# Patient Record
Sex: FEMALE | Race: WHITE | NOT HISPANIC OR LATINO | Employment: OTHER | URBAN - METROPOLITAN AREA
[De-identification: names, ages, dates, MRNs, and addresses within clinical notes are randomized per-mention and may not be internally consistent; named-entity substitution may affect disease eponyms.]

---

## 2017-02-13 RX ORDER — CARVEDILOL 6.25 MG/1
TABLET ORAL
Qty: 90 TABLET | Refills: 0 | Status: SHIPPED | OUTPATIENT
Start: 2017-02-13 | End: 2017-05-17 | Stop reason: SDUPTHER

## 2017-02-13 RX ORDER — FUROSEMIDE 40 MG/1
TABLET ORAL
Qty: 90 TABLET | Refills: 0 | Status: SHIPPED | OUTPATIENT
Start: 2017-02-13 | End: 2017-05-17 | Stop reason: SDUPTHER

## 2017-02-22 RX ORDER — ISOSORBIDE MONONITRATE 30 MG/1
TABLET, EXTENDED RELEASE ORAL
Qty: 90 TABLET | Refills: 1 | Status: SHIPPED | OUTPATIENT
Start: 2017-02-22 | End: 2017-09-25 | Stop reason: SDUPTHER

## 2017-03-02 RX ORDER — ATORVASTATIN CALCIUM 20 MG/1
TABLET, FILM COATED ORAL
Qty: 30 TABLET | Refills: 0 | Status: SHIPPED | OUTPATIENT
Start: 2017-03-02 | End: 2017-03-20

## 2017-03-06 ENCOUNTER — TELEPHONE (OUTPATIENT)
Dept: FAMILY MEDICINE CLINIC | Facility: CLINIC | Age: 67
End: 2017-03-06

## 2017-03-06 NOTE — TELEPHONE ENCOUNTER
ORDER FAXED    ----- Message from Imtiaz Cuellar sent at 3/3/2017 10:43 AM EST -----  PLEASE SEND ORDER FOR LABS TO [facility] ON [address] FOR HER TO HAVE DONE BEFORE HER FU APPT. WITH  ON 3/20/17.  SHE IS WANTING TO GO GET LABS DONE WK OF 3/6/17.  SHE WOULD LIKE TO GO 3/6/17 OR 3/7/17.

## 2017-03-07 RX ORDER — ATORVASTATIN CALCIUM 20 MG/1
TABLET, FILM COATED ORAL
Qty: 30 TABLET | Refills: 0 | Status: SHIPPED | OUTPATIENT
Start: 2017-03-07 | End: 2017-07-07 | Stop reason: CLARIF

## 2017-03-08 ENCOUNTER — TRANSCRIBE ORDERS (OUTPATIENT)
Dept: LAB | Facility: HOSPITAL | Age: 67
End: 2017-03-08

## 2017-03-08 ENCOUNTER — LAB (OUTPATIENT)
Dept: LAB | Facility: HOSPITAL | Age: 67
End: 2017-03-08

## 2017-03-08 DIAGNOSIS — I15.9 SECONDARY HYPERTENSION: ICD-10-CM

## 2017-03-08 DIAGNOSIS — E11.69 TYPE 2 DIABETES MELLITUS WITH OTHER SPECIFIED COMPLICATION (HCC): ICD-10-CM

## 2017-03-08 DIAGNOSIS — N18.9 CHRONIC KIDNEY DISEASE, UNSPECIFIED STAGE: ICD-10-CM

## 2017-03-08 DIAGNOSIS — E78.5 HYPERLIPIDEMIA, UNSPECIFIED HYPERLIPIDEMIA TYPE: ICD-10-CM

## 2017-03-08 DIAGNOSIS — E11.69 TYPE 2 DIABETES MELLITUS WITH OTHER SPECIFIED COMPLICATION (HCC): Primary | ICD-10-CM

## 2017-03-08 LAB
ALBUMIN SERPL-MCNC: 4.1 G/DL (ref 3.5–5.2)
ALBUMIN/GLOB SERPL: 1.5 G/DL
ALP SERPL-CCNC: 68 U/L (ref 39–117)
ALT SERPL W P-5'-P-CCNC: 30 U/L (ref 1–33)
ANION GAP SERPL CALCULATED.3IONS-SCNC: 14 MMOL/L
AST SERPL-CCNC: 28 U/L (ref 1–32)
BASOPHILS # BLD AUTO: 0.01 10*3/MM3 (ref 0–0.2)
BASOPHILS NFR BLD AUTO: 0.3 % (ref 0–1.5)
BILIRUB SERPL-MCNC: 0.3 MG/DL (ref 0.1–1.2)
BUN BLD-MCNC: 21 MG/DL (ref 8–23)
BUN/CREAT SERPL: 15.8 (ref 7–25)
CALCIUM SPEC-SCNC: 10.4 MG/DL (ref 8.6–10.5)
CHLORIDE SERPL-SCNC: 103 MMOL/L (ref 98–107)
CHOLEST SERPL-MCNC: 218 MG/DL (ref 0–200)
CO2 SERPL-SCNC: 24 MMOL/L (ref 22–29)
CREAT BLD-MCNC: 1.33 MG/DL (ref 0.57–1)
DEPRECATED RDW RBC AUTO: 43.9 FL (ref 37–54)
EOSINOPHIL # BLD AUTO: 0.06 10*3/MM3 (ref 0–0.7)
EOSINOPHIL NFR BLD AUTO: 1.5 % (ref 0.3–6.2)
ERYTHROCYTE [DISTWIDTH] IN BLOOD BY AUTOMATED COUNT: 13.1 % (ref 11.7–13)
GFR SERPL CREATININE-BSD FRML MDRD: 40 ML/MIN/1.73
GLOBULIN UR ELPH-MCNC: 2.7 GM/DL
GLUCOSE BLD-MCNC: 153 MG/DL (ref 65–99)
HBA1C MFR BLD: 6.9 % (ref 4.8–5.6)
HCT VFR BLD AUTO: 33.2 % (ref 35.6–45.5)
HDLC SERPL-MCNC: 53 MG/DL (ref 40–60)
HGB BLD-MCNC: 11.5 G/DL (ref 11.9–15.5)
IMM GRANULOCYTES # BLD: 0 10*3/MM3 (ref 0–0.03)
IMM GRANULOCYTES NFR BLD: 0 % (ref 0–0.5)
LDLC SERPL CALC-MCNC: 120 MG/DL (ref 0–100)
LDLC/HDLC SERPL: 2.26 {RATIO}
LYMPHOCYTES # BLD AUTO: 1.14 10*3/MM3 (ref 0.9–4.8)
LYMPHOCYTES NFR BLD AUTO: 29.2 % (ref 19.6–45.3)
MCH RBC QN AUTO: 31.9 PG (ref 26.9–32)
MCHC RBC AUTO-ENTMCNC: 34.6 G/DL (ref 32.4–36.3)
MCV RBC AUTO: 92 FL (ref 80.5–98.2)
MONOCYTES # BLD AUTO: 0.19 10*3/MM3 (ref 0.2–1.2)
MONOCYTES NFR BLD AUTO: 4.9 % (ref 5–12)
NEUTROPHILS # BLD AUTO: 2.51 10*3/MM3 (ref 1.9–8.1)
NEUTROPHILS NFR BLD AUTO: 64.1 % (ref 42.7–76)
PLATELET # BLD AUTO: 101 10*3/MM3 (ref 140–500)
PMV BLD AUTO: 10.3 FL (ref 6–12)
POTASSIUM BLD-SCNC: 3.9 MMOL/L (ref 3.5–5.2)
PROT SERPL-MCNC: 6.8 G/DL (ref 6–8.5)
RBC # BLD AUTO: 3.61 10*6/MM3 (ref 3.9–5.2)
SODIUM BLD-SCNC: 141 MMOL/L (ref 136–145)
TRIGL SERPL-MCNC: 227 MG/DL (ref 0–150)
VLDLC SERPL-MCNC: 45.4 MG/DL (ref 5–40)
WBC NRBC COR # BLD: 3.91 10*3/MM3 (ref 4.5–10.7)

## 2017-03-08 PROCEDURE — 85025 COMPLETE CBC W/AUTO DIFF WBC: CPT

## 2017-03-08 PROCEDURE — 36415 COLL VENOUS BLD VENIPUNCTURE: CPT

## 2017-03-08 PROCEDURE — 83036 HEMOGLOBIN GLYCOSYLATED A1C: CPT

## 2017-03-08 PROCEDURE — 80061 LIPID PANEL: CPT

## 2017-03-08 PROCEDURE — 80053 COMPREHEN METABOLIC PANEL: CPT

## 2017-03-20 ENCOUNTER — OFFICE VISIT (OUTPATIENT)
Dept: FAMILY MEDICINE CLINIC | Facility: CLINIC | Age: 67
End: 2017-03-20

## 2017-03-20 VITALS
SYSTOLIC BLOOD PRESSURE: 148 MMHG | RESPIRATION RATE: 16 BRPM | HEART RATE: 80 BPM | DIASTOLIC BLOOD PRESSURE: 68 MMHG | HEIGHT: 63 IN | OXYGEN SATURATION: 97 % | TEMPERATURE: 98.1 F | WEIGHT: 194 LBS | BODY MASS INDEX: 34.38 KG/M2

## 2017-03-20 DIAGNOSIS — I25.10 CHRONIC CORONARY ARTERY DISEASE: ICD-10-CM

## 2017-03-20 DIAGNOSIS — R53.82 CHRONIC FATIGUE: ICD-10-CM

## 2017-03-20 DIAGNOSIS — D64.9 ANEMIA, UNSPECIFIED TYPE: ICD-10-CM

## 2017-03-20 DIAGNOSIS — D69.6 THROMBOCYTOPENIA (HCC): ICD-10-CM

## 2017-03-20 DIAGNOSIS — N18.2 CHRONIC KIDNEY DISEASE, STAGE II (MILD): ICD-10-CM

## 2017-03-20 DIAGNOSIS — E11.21 TYPE 2 DIABETES MELLITUS WITH DIABETIC NEPHROPATHY, WITHOUT LONG-TERM CURRENT USE OF INSULIN (HCC): ICD-10-CM

## 2017-03-20 DIAGNOSIS — I10 ESSENTIAL HYPERTENSION: ICD-10-CM

## 2017-03-20 DIAGNOSIS — E78.2 MIXED HYPERLIPIDEMIA: ICD-10-CM

## 2017-03-20 DIAGNOSIS — R05.9 COUGH: Primary | ICD-10-CM

## 2017-03-20 PROCEDURE — 99213 OFFICE O/P EST LOW 20 MIN: CPT

## 2017-03-20 RX ORDER — PROMETHAZINE HYDROCHLORIDE AND CODEINE PHOSPHATE 6.25; 1 MG/5ML; MG/5ML
5 SYRUP ORAL EVERY 4 HOURS PRN
Qty: 180 ML | Refills: 0 | Status: SHIPPED | OUTPATIENT
Start: 2017-03-20 | End: 2017-05-12

## 2017-03-20 NOTE — PROGRESS NOTES
Subjective   Paty Hernandez is a 66 y.o. female. Patient is here today for   Chief Complaint   Patient presents with   • Diabetes   • Hyperlipidemia   • Hypertension   • Cough          Vitals:    03/20/17 1048   BP: 148/68   Pulse: 80   Resp: 16   Temp: 98.1 °F (36.7 °C)   SpO2: 97%     The following portions of the patient's history were reviewed and updated as appropriate: allergies, current medications, past family history, past medical history, past social history, past surgical history and problem list.    Past Medical History   Diagnosis Date   • Arthritis    • Asthmatic bronchitis    • Breast mass, right    • Bronchitis    • Cough    • Diabetes mellitus      TYPE 2   • Dizziness    • GERD (gastroesophageal reflux disease)    • Heart burn    • Hypertension    • Irritable bowel syndrome    • Migraine headache    • UTI (urinary tract infection)       Allergies   Allergen Reactions   • Contrast Dye      Due to renal insufficiency not d/t a reaction   • Iodinated Diagnostic Agents      Due to renal insufficiency not d/t a reaction   • Niacin And Related    • Sulfa Antibiotics    • Chloraprep One Step [Chlorhexidine Gluconate] Rash     Blistery Rash      Social History     Social History   • Marital status:      Spouse name: N/A   • Number of children: N/A   • Years of education: N/A     Occupational History   • Not on file.     Social History Main Topics   • Smoking status: Former Smoker   • Smokeless tobacco: Never Used   • Alcohol use Yes      Comment: SOCIAL   • Drug use: No   • Sexual activity: Not on file     Other Topics Concern   • Not on file     Social History Narrative        Current Outpatient Prescriptions:   •  ALPRAZolam (XANAX) 1 MG tablet, Take 1 tablet by mouth 3 (three) times a day as needed for anxiety., Disp: 90 tablet, Rfl: 1  •  Ascorbic Acid (VITAMIN C PO), Take 1 tablet/day by mouth daily., Disp: , Rfl:   •  aspirin 81 MG tablet, Take 81 mg by mouth daily., Disp: , Rfl:   •   atorvastatin (LIPITOR) 20 MG tablet, TAKE ONE TABLET BY MOUTH DAILY, Disp: 30 tablet, Rfl: 0  •  carvedilol (COREG) 6.25 MG tablet, TAKE ONE TABLET BY MOUTH DAILY, Disp: 90 tablet, Rfl: 0  •  Cetirizine HCl (ZYRTEC ALLERGY) 10 MG capsule, Take 10 mg by mouth daily., Disp: , Rfl:   •  cholecalciferol (VITAMIN D3) 1000 UNITS tablet, Take 1,000 Units by mouth daily., Disp: , Rfl:   •  esomeprazole (NexIUM) 40 MG capsule, , Disp: , Rfl:   •  FIBER PO, Take  by mouth daily., Disp: , Rfl:   •  furosemide (LASIX) 40 MG tablet, TAKE ONE TABLET BY MOUTH DAILY, Disp: 90 tablet, Rfl: 0  •  isosorbide mononitrate (IMDUR) 30 MG 24 hr tablet, TAKE ONE TABLET BY MOUTH DAILY, Disp: 90 tablet, Rfl: 1  •  losartan (COZAAR) 100 MG tablet, TAKE ONE TABLET BY MOUTH DAILY, Disp: 90 tablet, Rfl: 2  •  lubiprostone (AMITIZA) 8 MCG capsule, Take 8 mcg by mouth daily as needed for constipation. Take 2 tablets as needed, Disp: , Rfl:   •  MELATONIN PO, Take 1 tablet/day by mouth daily as needed., Disp: , Rfl:   •  montelukast (SINGULAIR) 10 MG tablet, TAKE ONE TABLET BY MOUTH DAILY, Disp: 90 tablet, Rfl: 1  •  Multiple Vitamins-Minerals (MULTIVITAMIN ADULT PO), Take 1 tablet by mouth daily., Disp: , Rfl:   •  omeprazole (priLOSEC) 20 MG capsule, TAKE ONE CAPSULE BY MOUTH EVERY MORNING, Disp: 90 capsule, Rfl: 3  •  polyethylene glycol (MIRALAX) packet, Take 17 g by mouth daily as needed., Disp: , Rfl:   •  Probiotic Product (PROBIOTIC PO), Take 2 tablet/day by mouth daily., Disp: , Rfl:   •  promethazine-codeine (PHENERGAN with CODEINE) 6.25-10 MG/5ML syrup, Take 5 mL by mouth Every 4 (Four) Hours As Needed for Cough. TAKE 1 TO 2 TEASPOONFULS BY MOUTH EVERY 4 HOURS AS NEEDED FOR COUGH, Disp: 180 mL, Rfl: 0  •  sertraline (ZOLOFT) 100 MG tablet, TAKE TWO TABLETS BY MOUTH EVERY NIGHT AT BEDTIME, Disp: 180 tablet, Rfl: 1  •  SITagliptin (JANUVIA) 50 MG tablet, Take 1 tablet by mouth Daily., Disp: 30 tablet, Rfl: 5  •  tiZANidine (ZANAFLEX) 4 MG  tablet, Take 4 mg by mouth daily as needed., Disp: , Rfl:   •  vitamin A 15103 UNIT capsule, Take 10,000 Units by mouth daily., Disp: , Rfl:   •  vitamin B-6 (PYRIDOXINE) 100 MG tablet, Take  by mouth daily., Disp: , Rfl:      Objective     History of Present Illness   The patient is here today for follow-up on type 2 diabetes mellitus, essential hypertension, hyperlipidemia, chronic coronary artery disease and persistent cough for well over a month    Review of Systems   Constitutional: Negative for chills and fever.   HENT:        The patient feels a sense of congestion in her sinuses, head, and ears.  She senses some postnasal drainage which tends to make her cough quite frequently.  The patient has no fever or chills at this time.  She takes over-the-counter Zyrtec and prescription Singulair daily.   Respiratory: Negative for wheezing.         The patient states she has had a cough, quite frequent, the last few months.  The patient states that the cough is usually minimally productive.  She has mild shortness of air with exertion.   Cardiovascular: Negative for chest pain, palpitations and leg swelling.   Gastrointestinal: Negative for abdominal pain and blood in stool.        The patient does have chronic constipation and occasionally uses Amitiza for this   Genitourinary:        The patient was having severe problems with persistent urinary tract infection last year.  Currently she is having no symptoms.   Musculoskeletal:        The patient has a long history of chronic back pain from degenerative arthritis and degenerative disc disease of the spine   Neurological: Negative.    Hematological: Negative for adenopathy. Does not bruise/bleed easily.   Psychiatric/Behavioral:        The patient has a history of depression but is doing fairly well on her current medications.  The patient had to put her  in a nursing home due to dementia which has caused her much stress.       Physical Exam   Constitutional:  She is oriented to person, place, and time. She appears well-developed and well-nourished.   Overweight   HENT:   Mouth/Throat: Oropharynx is clear and moist.   Tympanic membranes are mildly inflamed bilaterally.  Sinuses seem somewhat congested.  Nose is somewhat congested.   Neck:   Carotid pulses normal.   Cardiovascular: Normal rate and regular rhythm.    Pulmonary/Chest: Effort normal and breath sounds normal. No respiratory distress. She has no wheezes. She has no rales.   Abdominal: Soft. Bowel sounds are normal.   Musculoskeletal:   Mild osteoarthritic changes in multiple joints.  Restriction of motion in the lumbar spine area.   Neurological: She is alert and oriented to person, place, and time.   Psychiatric: She has a normal mood and affect.   Nursing note and vitals reviewed.      ASSESSMENT  #1 type 2 diabetes mellitus        #2 essential hypertension        #3 mixed hyperlipidemia          #4 chronic coronary artery disease         #5 persistent cough           #6 chronic kidney disease, moderate         #7 mild anemia    DISCUSSION/SUMMARY   The patient's vital signs are normal today.  Because of her cough.  I did a chest x-ray which is essentially normal except for an old calcified granuloma in the left lower lobe and is unchanged from a chest x-ray that was done here in 2012.  CBC showed a slightly low hemoglobin of 11.5 and a very slightly decreased total white blood cell count and low platelet count of 101,000.  CMP shows an elevated fasting blood sugar 153 and an elevated serum creatinine at 1.33.  Total cholesterol is elevated at 218, triglycerides 227, HDL cholesterol 53, and LDL cholesterol is 120.  The patient admits that she has not eaten nearly as well as she should because she is now living alone and also she is getting very little exercise.  I encouraged her to increase her walking and admonished her to decrease her sugar and starch in her diet.  With respect to her cough I believe she  has upper respiratory problems giving her postnasal drainage which is causing her chronic cough.  I have asked her to see her otolaryngologist, Dr. Hare, because she is already seen her allergist and allergy testing was negative.    PLAN  Continue same medications except I have asked her to take some over-the-counter low-dose renal (tablets 3 times a day as well as getting some Flonase nasal spray to add to her Zyrtec and Singulair.  Recheck in 6 months with fasting CMP, lipid panel, CBC, and HbA1c.  No Follow-up on file.

## 2017-03-29 ENCOUNTER — TELEPHONE (OUTPATIENT)
Dept: FAMILY MEDICINE CLINIC | Facility: CLINIC | Age: 67
End: 2017-03-29

## 2017-03-29 NOTE — TELEPHONE ENCOUNTER
SPOKE WITH PATIENT AND LET HER KNOW THAT DR. MOJICA IS GOING TO RECOMMEND DOING ANYTHING DR. RIVERA SUGGESTS SHE DO. THAT IT IS UP TO HER IF SHE DOES NOT WANT TO PROCEED WITH HAVING SURGERY, BUT SHE SHOULD ADDRESS THESE CONCERNS WITH DR. RIVERA. SHE HAS AN APPOINTMENT TO SEE HIM TOMORROW.    ----- Message from David Guan MA sent at 3/29/2017 10:34 AM EDT -----  Contact: PATIENT   PATIENT WANTS DR. MOJICA TO SPEAK WITH DR RIVERA ABOUT AN UPCOMING SURGERY SHE DOES NOT WANT SURGERY AND WANTS DR MOJICA TO SEE IF THERE IS ANOTHER ALTERNATIVE. SHE WOULD LIKE A CALL BACK THANK YOU

## 2017-03-31 ENCOUNTER — TRANSCRIBE ORDERS (OUTPATIENT)
Dept: ADMINISTRATIVE | Facility: HOSPITAL | Age: 67
End: 2017-03-31

## 2017-03-31 DIAGNOSIS — R05.9 COUGH: Primary | ICD-10-CM

## 2017-04-04 ENCOUNTER — HOSPITAL ENCOUNTER (OUTPATIENT)
Dept: RESPIRATORY THERAPY | Facility: HOSPITAL | Age: 67
Discharge: HOME | End: 2017-04-04
Attending: OTOLARYNGOLOGY | Admitting: OTOLARYNGOLOGY

## 2017-04-04 DIAGNOSIS — R05.9 COUGH: ICD-10-CM

## 2017-04-04 PROCEDURE — 63710000001 ALBUTEROL PER 1 MG: Performed by: OTOLARYNGOLOGY

## 2017-04-04 PROCEDURE — A9270 NON-COVERED ITEM OR SERVICE: HCPCS | Performed by: OTOLARYNGOLOGY

## 2017-04-04 PROCEDURE — 94729 DIFFUSING CAPACITY: CPT

## 2017-04-04 PROCEDURE — 94726 PLETHYSMOGRAPHY LUNG VOLUMES: CPT

## 2017-04-04 PROCEDURE — 94060 EVALUATION OF WHEEZING: CPT

## 2017-04-04 RX ORDER — ALBUTEROL SULFATE 2.5 MG/3ML
SOLUTION RESPIRATORY (INHALATION)
Status: DISPENSED
Start: 2017-04-04 | End: 2017-04-05

## 2017-04-04 RX ORDER — ALBUTEROL SULFATE 2.5 MG/3ML
2.5 SOLUTION RESPIRATORY (INHALATION) ONCE
Status: COMPLETED | OUTPATIENT
Start: 2017-04-04 | End: 2017-04-04

## 2017-04-04 RX ADMIN — ALBUTEROL SULFATE 2.5 MG: 2.5 SOLUTION RESPIRATORY (INHALATION) at 11:41

## 2017-04-07 ENCOUNTER — TRANSCRIBE ORDERS (OUTPATIENT)
Dept: ADMINISTRATIVE | Facility: HOSPITAL | Age: 67
End: 2017-04-07

## 2017-04-07 DIAGNOSIS — R05.9 COUGH: Primary | ICD-10-CM

## 2017-04-10 RX ORDER — ATORVASTATIN CALCIUM 20 MG/1
TABLET, FILM COATED ORAL
Qty: 30 TABLET | Refills: 5 | Status: SHIPPED | OUTPATIENT
Start: 2017-04-10 | End: 2017-05-12 | Stop reason: SDUPTHER

## 2017-04-19 RX ORDER — ALPRAZOLAM 1 MG/1
TABLET ORAL
Qty: 90 TABLET | Refills: 3 | Status: SHIPPED | OUTPATIENT
Start: 2017-04-19 | End: 2017-11-14 | Stop reason: SDUPTHER

## 2017-04-21 ENCOUNTER — TELEPHONE (OUTPATIENT)
Dept: FAMILY MEDICINE CLINIC | Facility: CLINIC | Age: 67
End: 2017-04-21

## 2017-04-21 NOTE — TELEPHONE ENCOUNTER
PATIENT AWARE THIS IS READY FOR     ----- Message from Grace Fernández sent at 4/20/2017 10:48 AM EDT -----  Contact: PT  WRITTEN RX    XANAX 1 MG 1 PO TID  # 90 W/ REFILLS PER PT    CALL WHEN READY

## 2017-05-01 RX ORDER — SERTRALINE HYDROCHLORIDE 100 MG/1
TABLET, FILM COATED ORAL
Qty: 180 TABLET | Refills: 1 | Status: SHIPPED | OUTPATIENT
Start: 2017-05-01 | End: 2017-11-06 | Stop reason: SDUPTHER

## 2017-05-01 RX ORDER — LOSARTAN POTASSIUM 100 MG/1
TABLET ORAL
Qty: 90 TABLET | Refills: 1 | Status: SHIPPED | OUTPATIENT
Start: 2017-05-01 | End: 2017-11-06 | Stop reason: SDUPTHER

## 2017-05-12 ENCOUNTER — OFFICE VISIT (OUTPATIENT)
Dept: SURGERY | Facility: CLINIC | Age: 67
End: 2017-05-12

## 2017-05-12 VITALS
HEIGHT: 63 IN | SYSTOLIC BLOOD PRESSURE: 124 MMHG | BODY MASS INDEX: 33.58 KG/M2 | HEART RATE: 82 BPM | OXYGEN SATURATION: 98 % | WEIGHT: 189.5 LBS | DIASTOLIC BLOOD PRESSURE: 60 MMHG

## 2017-05-12 DIAGNOSIS — IMO0001 HISTORY OF CLOSTRIDIUM DIFFICILE: ICD-10-CM

## 2017-05-12 DIAGNOSIS — K59.01 SLOW TRANSIT CONSTIPATION: Primary | ICD-10-CM

## 2017-05-12 PROCEDURE — 99214 OFFICE O/P EST MOD 30 MIN: CPT | Performed by: COLON & RECTAL SURGERY

## 2017-05-12 RX ORDER — OMEGA-3-ACID ETHYL ESTERS 1 G/1
1 CAPSULE, LIQUID FILLED ORAL
COMMUNITY
End: 2018-02-09

## 2017-05-12 RX ORDER — SACCHAROMYCES BOULARDII 250 MG
250 CAPSULE ORAL 2 TIMES DAILY
Qty: 60 CAPSULE | Refills: 2 | Status: SHIPPED | OUTPATIENT
Start: 2017-05-12 | End: 2017-09-25

## 2017-05-12 RX ORDER — VALACYCLOVIR HYDROCHLORIDE 500 MG/1
TABLET, FILM COATED ORAL
COMMUNITY
Start: 2017-04-10 | End: 2018-01-11

## 2017-05-12 RX ORDER — METHYLPREDNISOLONE 4 MG/1
TABLET ORAL
COMMUNITY
Start: 2017-04-06 | End: 2017-09-25

## 2017-05-17 RX ORDER — CARVEDILOL 6.25 MG/1
TABLET ORAL
Qty: 90 TABLET | Refills: 1 | Status: SHIPPED | OUTPATIENT
Start: 2017-05-17 | End: 2017-11-17 | Stop reason: SDUPTHER

## 2017-05-17 RX ORDER — FUROSEMIDE 40 MG/1
TABLET ORAL
Qty: 90 TABLET | Refills: 1 | Status: SHIPPED | OUTPATIENT
Start: 2017-05-17 | End: 2017-11-17 | Stop reason: SDUPTHER

## 2017-06-12 RX ORDER — MONTELUKAST SODIUM 10 MG/1
TABLET ORAL
Qty: 90 TABLET | Refills: 3 | Status: SHIPPED | OUTPATIENT
Start: 2017-06-12 | End: 2018-06-11 | Stop reason: SDUPTHER

## 2017-06-15 ENCOUNTER — TELEPHONE (OUTPATIENT)
Dept: FAMILY MEDICINE CLINIC | Facility: CLINIC | Age: 67
End: 2017-06-15

## 2017-06-15 NOTE — TELEPHONE ENCOUNTER
I made patient aware that this was nothing to be concerned about but if it was I would call her. Otherwise, there's nothing she needs to do for now.    ----- Message from Logan Dhaliwal MD sent at 6/15/2017  8:28 AM EDT -----   Tell patient that her white blood cell count is only minimally decreased so this is something that we just follow along every 6 months.  With her next appointment she will have another CBC to check on this.  If it goes down significantly I would send her to a blood doctor  ----- Message -----     From: Martine Brizuela MA     Sent: 6/14/2017   3:00 PM       To: Logan Dhaliwal MD    I spoke with patient and let her know if her nephrologist is telling her not to worry about this then she shouldn't (wbc was 3.9 in March and has been low for the past year) but she wanted me to let you know. I told her I would call her if you wanted her to do anything about this.    ----- Message -----     From: Lissa Pavon     Sent: 6/12/2017   8:15 AM       To: Martine Brizuela MA    WHITE BLOOD COUNT IS OFF ( IS LOW )     DR ANDERSEN   SAYS NOT TO WORRY ABOUT IT     SHE IS WANTING DR CANALES TO REVIEW HER BLOOD WORK   AND TO LET HER KNOW WHAT SHE NEEDS TO DO    SAYS SHE IS TIRED ALL THE TIME   SAYS THERE IS A LETTER FROM DR ANDERSEN TO [facility] THAT SHOULD BE IN HER CHART       PLEASE CALL HER TO DISCUSS     [phone]

## 2017-07-07 ENCOUNTER — OFFICE VISIT (OUTPATIENT)
Dept: SURGERY | Facility: CLINIC | Age: 67
End: 2017-07-07

## 2017-07-07 VITALS
WEIGHT: 187.1 LBS | SYSTOLIC BLOOD PRESSURE: 134 MMHG | OXYGEN SATURATION: 98 % | DIASTOLIC BLOOD PRESSURE: 62 MMHG | BODY MASS INDEX: 33.14 KG/M2 | TEMPERATURE: 98.5 F | HEART RATE: 69 BPM

## 2017-07-07 DIAGNOSIS — R15.9 FECAL INCONTINENCE: ICD-10-CM

## 2017-07-07 DIAGNOSIS — K59.01 SLOW TRANSIT CONSTIPATION: Primary | ICD-10-CM

## 2017-07-07 DIAGNOSIS — IMO0001 HISTORY OF CLOSTRIDIUM DIFFICILE: ICD-10-CM

## 2017-07-07 PROCEDURE — 99213 OFFICE O/P EST LOW 20 MIN: CPT | Performed by: COLON & RECTAL SURGERY

## 2017-07-07 RX ORDER — ROSUVASTATIN CALCIUM 10 MG/1
TABLET, COATED ORAL
COMMUNITY
Start: 2017-06-14 | End: 2018-06-11 | Stop reason: SDUPTHER

## 2017-07-07 RX ORDER — TESTOSTERONE CYPIONATE 200 MG/ML
INJECTION INTRAMUSCULAR
COMMUNITY
Start: 2017-06-09 | End: 2018-11-10 | Stop reason: HOSPADM

## 2017-07-07 RX ORDER — FOSFOMYCIN TROMETHAMINE 3 G/1
POWDER ORAL
COMMUNITY
Start: 2017-06-28 | End: 2018-02-09

## 2017-07-07 NOTE — PROGRESS NOTES
"Paty Hernandez is a 66 y.o. female in for follow up of constipation and hx C diff      Pt seeing ID Dr. Connelly for recurrent E coli UTIs  Taking Monurol (fosfomycin)    Occasionally has mushy stool in her underwear after waking up in the morning or after a nap  Occurred 3-4 times on the past 2 weeks    Not taking Amitiza or Linzess  They were too strong    \"Lately I haven't had to take anything\" to have BMs    No Miralax    Takes 2 fiber gummies and probiotics    Yesterday fell off of her porch  No LOC  Small superficial skin abrasion L lateral lower leg     in a SNF for dementia    /62 (BP Location: Left arm, Patient Position: Sitting)  Pulse 69  Temp 98.5 °F (36.9 °C)  Wt 187 lb 1.6 oz (84.9 kg)  LMP  (LMP Unknown)  SpO2 98%  BMI 33.14 kg/m2  Body mass index is 33.14 kg/(m^2).      PE:  Physical Exam   Constitutional: She appears well-developed. No distress.   HENT:   Head: Normocephalic and atraumatic.   Abdominal: Soft. She exhibits no distension.   Musculoskeletal: Normal range of motion.   Neurological: She is alert.   Psychiatric: Thought content normal.         Assessment:   1. Slow transit constipation    2. History of Clostridium difficile    3. Fecal incontinence         Plan:      I discussed options of potential treatment of fecal incontinence being stool bulking agents like fiber or Imodium.  I recommend she increase fiber to 2 gummies bid.  Can take miralax if stools becoming hard, but increases likelihood of FI.    Next cy Feb 2018 for hx polyps    RTC prn      Scribed for Cady Gudino MD by Bibiana Diallo PA-C 7/7/2017    This patient was evaluated by me, recommendations made, documentation reviewed, edited, and revised by me, Cady Gudino MD          EMR Dragon/Transcription disclaimer:   Much of this encounter note is an electronic transcription/translation of spoken language to printed text. The electronic translation of spoken language may permit erroneous, or at " times, nonsensical words or phrases to be inadvertently transcribed; Although I have reviewed the note for such errors, some may still exist.

## 2017-08-04 ENCOUNTER — APPOINTMENT (OUTPATIENT)
Dept: WOMENS IMAGING | Facility: HOSPITAL | Age: 67
End: 2017-08-04

## 2017-08-04 PROCEDURE — G0202 SCR MAMMO BI INCL CAD: HCPCS | Performed by: RADIOLOGY

## 2017-08-04 PROCEDURE — 77063 BREAST TOMOSYNTHESIS BI: CPT | Performed by: RADIOLOGY

## 2017-08-28 RX ORDER — PROMETHAZINE HYDROCHLORIDE AND CODEINE PHOSPHATE 6.25; 1 MG/5ML; MG/5ML
5 SYRUP ORAL EVERY 4 HOURS PRN
Qty: 180 ML | Refills: 0 | Status: SHIPPED | OUTPATIENT
Start: 2017-08-28 | End: 2017-11-07 | Stop reason: SDUPTHER

## 2017-08-29 ENCOUNTER — TELEPHONE (OUTPATIENT)
Dept: FAMILY MEDICINE CLINIC | Facility: CLINIC | Age: 67
End: 2017-08-29

## 2017-08-29 NOTE — TELEPHONE ENCOUNTER
PATIENT AWARE THIS IS READY FOR     ----- Message from Gissell Estrella MA sent at 8/28/2017  9:43 AM EDT -----  Contact: PT  PT SAYS SHE KEEPS COUGHING AND DR MOJICA HAS PRESCRIBED PROMETHAZIN CODINE COUGH SYRUP FOR HER BEFORE AND SHE IS ALMOST OUT / 6.25-10 MG / 5 ML SHE WOULD LIKE TO PICK THIS UP TOMORROW IF AT ALL POSSIBLE SHE CAN BE REACHED [phone]

## 2017-09-05 RX ORDER — SITAGLIPTIN 50 MG/1
TABLET, FILM COATED ORAL
Qty: 30 TABLET | Refills: 4 | Status: SHIPPED | OUTPATIENT
Start: 2017-09-05

## 2017-09-25 ENCOUNTER — OFFICE VISIT (OUTPATIENT)
Dept: FAMILY MEDICINE CLINIC | Facility: CLINIC | Age: 67
End: 2017-09-25

## 2017-09-25 VITALS
RESPIRATION RATE: 16 BRPM | DIASTOLIC BLOOD PRESSURE: 80 MMHG | BODY MASS INDEX: 32.6 KG/M2 | OXYGEN SATURATION: 97 % | SYSTOLIC BLOOD PRESSURE: 140 MMHG | HEIGHT: 63 IN | WEIGHT: 184 LBS | HEART RATE: 75 BPM | TEMPERATURE: 98.3 F

## 2017-09-25 DIAGNOSIS — D69.6 THROMBOCYTOPENIA (HCC): ICD-10-CM

## 2017-09-25 DIAGNOSIS — E78.2 MIXED HYPERLIPIDEMIA: ICD-10-CM

## 2017-09-25 DIAGNOSIS — I25.10 CHRONIC CORONARY ARTERY DISEASE: ICD-10-CM

## 2017-09-25 DIAGNOSIS — I10 BENIGN ESSENTIAL HYPERTENSION: ICD-10-CM

## 2017-09-25 DIAGNOSIS — E11.9 TYPE 2 DIABETES MELLITUS WITHOUT COMPLICATION, UNSPECIFIED LONG TERM INSULIN USE STATUS: ICD-10-CM

## 2017-09-25 DIAGNOSIS — E11.21 TYPE 2 DIABETES MELLITUS WITH DIABETIC NEPHROPATHY, WITHOUT LONG-TERM CURRENT USE OF INSULIN (HCC): Primary | ICD-10-CM

## 2017-09-25 DIAGNOSIS — D64.9 ANEMIA, UNSPECIFIED TYPE: ICD-10-CM

## 2017-09-25 DIAGNOSIS — N18.30 CHRONIC KIDNEY DISEASE, STAGE 3 (MODERATE): ICD-10-CM

## 2017-09-25 DIAGNOSIS — E78.5 HYPERLIPIDEMIA, UNSPECIFIED HYPERLIPIDEMIA TYPE: ICD-10-CM

## 2017-09-25 DIAGNOSIS — I10 ESSENTIAL HYPERTENSION: Primary | ICD-10-CM

## 2017-09-25 LAB
ALBUMIN SERPL-MCNC: 5 G/DL (ref 3.5–5.2)
ALBUMIN/GLOB SERPL: 2.5 G/DL
ALP SERPL-CCNC: 85 U/L (ref 39–117)
ALT SERPL-CCNC: 36 U/L (ref 1–33)
AST SERPL-CCNC: 42 U/L (ref 1–32)
BASOPHILS # BLD AUTO: 0.01 10*3/MM3 (ref 0–0.2)
BASOPHILS NFR BLD AUTO: 0.2 % (ref 0–1.5)
BILIRUB SERPL-MCNC: 0.6 MG/DL (ref 0.1–1.2)
BUN SERPL-MCNC: 20 MG/DL (ref 8–23)
BUN/CREAT SERPL: 14.3 (ref 7–25)
CALCIUM SERPL-MCNC: 10.3 MG/DL (ref 8.6–10.5)
CHLORIDE SERPL-SCNC: 100 MMOL/L (ref 98–107)
CHOLEST SERPL-MCNC: 170 MG/DL (ref 0–200)
CO2 SERPL-SCNC: 26.2 MMOL/L (ref 22–29)
CREAT SERPL-MCNC: 1.4 MG/DL (ref 0.57–1)
EOSINOPHIL # BLD AUTO: 0.11 10*3/MM3 (ref 0–0.7)
EOSINOPHIL NFR BLD AUTO: 2.4 % (ref 0.3–6.2)
ERYTHROCYTE [DISTWIDTH] IN BLOOD BY AUTOMATED COUNT: 15 % (ref 11.7–13)
GLOBULIN SER CALC-MCNC: 2 GM/DL
GLUCOSE SERPL-MCNC: 139 MG/DL (ref 65–99)
HBA1C MFR BLD: 6.79 % (ref 4.8–5.6)
HCT VFR BLD AUTO: 36.1 % (ref 35.6–45.5)
HDLC SERPL-MCNC: 67 MG/DL (ref 40–60)
HGB BLD-MCNC: 11.8 G/DL (ref 11.9–15.5)
IMM GRANULOCYTES # BLD: 0 10*3/MM3 (ref 0–0.03)
IMM GRANULOCYTES NFR BLD: 0 % (ref 0–0.5)
LDLC SERPL CALC-MCNC: 75 MG/DL (ref 0–100)
LDLC/HDLC SERPL: 1.12 {RATIO}
LYMPHOCYTES # BLD AUTO: 1.16 10*3/MM3 (ref 0.9–4.8)
LYMPHOCYTES NFR BLD AUTO: 24.9 % (ref 19.6–45.3)
MCH RBC QN AUTO: 32.7 PG (ref 26.9–32)
MCHC RBC AUTO-ENTMCNC: 32.7 G/DL (ref 32.4–36.3)
MCV RBC AUTO: 100 FL (ref 80.5–98.2)
MONOCYTES # BLD AUTO: 0.33 10*3/MM3 (ref 0.2–1.2)
MONOCYTES NFR BLD AUTO: 7.1 % (ref 5–12)
NEUTROPHILS # BLD AUTO: 3.05 10*3/MM3 (ref 1.9–8.1)
NEUTROPHILS NFR BLD AUTO: 65.4 % (ref 42.7–76)
PLATELET # BLD AUTO: 106 10*3/MM3 (ref 140–500)
POTASSIUM SERPL-SCNC: 4.2 MMOL/L (ref 3.5–5.2)
PROT SERPL-MCNC: 7 G/DL (ref 6–8.5)
RBC # BLD AUTO: 3.61 10*6/MM3 (ref 3.9–5.2)
SODIUM SERPL-SCNC: 141 MMOL/L (ref 136–145)
TRIGL SERPL-MCNC: 139 MG/DL (ref 0–150)
VLDLC SERPL CALC-MCNC: 27.8 MG/DL (ref 5–40)
WBC # BLD AUTO: 4.66 10*3/MM3 (ref 4.5–10.7)

## 2017-09-25 PROCEDURE — 99213 OFFICE O/P EST LOW 20 MIN: CPT

## 2017-09-25 NOTE — PROGRESS NOTES
Hugo Hernandez is a 67 y.o. female. Patient is here today for No chief complaint on file.         Vitals:    09/25/17 0948   BP: 140/80   Pulse: 75   Resp: 16   Temp: 98.3 °F (36.8 °C)   SpO2: 97%     The following portions of the patient's history were reviewed and updated as appropriate: allergies, current medications, past family history, past medical history, past social history, past surgical history and problem list.    Past Medical History:   Diagnosis Date   • Abdominal pain, acute 06/04/2015    SEEN AT [facility] ER   • Acute back pain 08/07/2008    SEEN AT [facility] ER   • Acute bronchitis 03/26/2005    SEEN AT [facility] ER   • Acute constipation 05/17/2015    SEEN AT [facility]   • Acute lateral meniscus tear of left knee 11/2013   • Adjustment disorder 12/24/2015    SEEN AT [facility] ER   • Allergic rhinitis 08/11/2015    SEES    • Anemia    • Anxiety    • Arthritis    • Asthmatic bronchitis    • Breast mass, right    • Bronchitis    • CAD (coronary artery disease)    • Cervico-occipital neuralgia    • Chronic idiopathic constipation    • Chronic maxillary sinusitis 08/11/2015    SEES    • Chronic pain    • CKD (chronic kidney disease)     STAGE II   • Colon polyps    • Cough    • DDD (degenerative disc disease), lumbar    • Depression    • Diabetes mellitus     TYPE 2   • Dizziness    • Fall 05/15/2013    CONTUSION BILATERAL KNEES, SEEN AT [facility] ER   • Fracture of phalanx of finger of left hand 05/15/2013    4TH AND 5TH PROXIMAL, D/T FALL, SEEN AT [facility] ER   • GERD (gastroesophageal reflux disease)    • Heart burn    • HLD (hyperlipidemia)    • Hypertension    • Irritable bowel syndrome    • Laceration of finger, index 09/01/2007    RIGHT HAND, SEEN AT [facility] ER   • Left knee injury 11/05/2013    SEEN AT [facility] ER   • Low back pain    • Lumbar radiculopathy    • Migraine headache    • Mixed anxiety and depressive disorder    • OA (osteoarthritis)    • KEYSHA (obstructive sleep apnea)    • Osteoarthritis    • Pes  anserine bursitis    • Recurrent bacterial cystitis    • Spinal stenosis of lumbar region    • UTI (urinary tract infection)       Allergies   Allergen Reactions   • Contrast Dye      Due to renal insufficiency not d/t a reaction   • Iodinated Diagnostic Agents      Due to renal insufficiency not d/t a reaction   • Niacin And Related    • Sulfa Antibiotics    • Chloraprep One Step [Chlorhexidine Gluconate] Rash     Blistery Rash      Social History     Social History   • Marital status:      Spouse name: N/A   • Number of children: N/A   • Years of education: N/A     Occupational History   • Not on file.     Social History Main Topics   • Smoking status: Former Smoker     Types: Cigarettes     Quit date: 7/17/1990   • Smokeless tobacco: Never Used   • Alcohol use Yes      Comment: SOCIAL   • Drug use: No   • Sexual activity: Not on file     Other Topics Concern   • Not on file     Social History Narrative        Current Outpatient Prescriptions:   •  ALPRAZolam (XANAX) 1 MG tablet, TAKE ONE TABLET BY MOUTH THREE TIMES A DAY AS NEEDED FOR ANXIETY, Disp: 90 tablet, Rfl: 3  •  Ascorbic Acid (VITAMIN C PO), Take 1 tablet/day by mouth daily., Disp: , Rfl:   •  aspirin 81 MG tablet, Take 81 mg by mouth daily., Disp: , Rfl:   •  carvedilol (COREG) 6.25 MG tablet, TAKE ONE TABLET BY MOUTH DAILY, Disp: 90 tablet, Rfl: 1  •  dexamethasone sodium phosphate 120 MG/30ML solution injection, , Disp: , Rfl:   •  FIBER PO, Take  by mouth daily., Disp: , Rfl:   •  fluticasone-salmeterol (ADVAIR) 100-50 MCG/DOSE DISKUS, Inhale 1 puff., Disp: , Rfl:   •  furosemide (LASIX) 40 MG tablet, TAKE ONE TABLET BY MOUTH DAILY, Disp: 90 tablet, Rfl: 1  •  isosorbide mononitrate (IMDUR) 30 MG 24 hr tablet, TAKE ONE TABLET BY MOUTH DAILY, Disp: 90 tablet, Rfl: 1  •  JANUVIA 50 MG tablet, TAKE ONE TABLET BY MOUTH DAILY, Disp: 30 tablet, Rfl: 4  •  losartan (COZAAR) 100 MG tablet, TAKE ONE TABLET BY MOUTH DAILY, Disp: 90 tablet, Rfl: 1  •   montelukast (SINGULAIR) 10 MG tablet, TAKE ONE TABLET BY MOUTH DAILY, Disp: 90 tablet, Rfl: 3  •  MONUROL 3 G pack, , Disp: , Rfl:   •  Multiple Vitamins-Minerals (MULTIVITAMIN ADULT PO), Take 1 tablet by mouth daily., Disp: , Rfl:   •  omega-3 acid ethyl esters (LOVAZA) 1 G capsule, Take 1 g by mouth., Disp: , Rfl:   •  omeprazole (priLOSEC) 20 MG capsule, TAKE ONE CAPSULE BY MOUTH EVERY MORNING, Disp: 90 capsule, Rfl: 3  •  promethazine-codeine (PHENERGAN with CODEINE) 6.25-10 MG/5ML syrup, Take 5 mL by mouth Every 4 (Four) Hours As Needed for Cough., Disp: 180 mL, Rfl: 0  •  rosuvastatin (CRESTOR) 10 MG tablet, , Disp: , Rfl:   •  sertraline (ZOLOFT) 100 MG tablet, TAKE 2 TABLETS BY MOUTH EVERY NIGHT AT BEDTIME, Disp: 180 tablet, Rfl: 1  •  tiZANidine (ZANAFLEX) 4 MG tablet, Take 4 mg by mouth daily as needed., Disp: , Rfl:   •  valACYclovir (VALTREX) 500 MG tablet, , Disp: , Rfl:   •  vitamin A 80438 UNIT capsule, Take 10,000 Units by mouth daily., Disp: , Rfl:   •  vitamin B-6 (PYRIDOXINE) 100 MG tablet, Take  by mouth daily., Disp: , Rfl:   •  Cetirizine HCl (ZYRTEC ALLERGY) 10 MG capsule, Take 10 mg by mouth daily., Disp: , Rfl:   •  cholecalciferol (VITAMIN D3) 1000 UNITS tablet, Take 1,000 Units by mouth daily., Disp: , Rfl:   •  triamcinolone (KENALOG) 0.1 % ointment, , Disp: , Rfl:      Objective     History of Present Illness   The patient is here today for follow-up on type 2 diabetes mellitus, essential hypertension, and hyperlipidemia.    Review of Systems   Constitutional: Negative.    HENT: Negative.    Respiratory: Negative for cough, shortness of breath and wheezing.    Cardiovascular: Negative for chest pain, palpitations and leg swelling.   Gastrointestinal: Negative for abdominal pain, blood in stool and diarrhea.        Mild chronic constipation   Genitourinary:        History of repeated urinary tract infections.  The patient is following up with a specialist in infectious diseases because of  persistence of a urinary tract infection.  The patient is asymptomatic at this time.   Musculoskeletal:        Chronic back and leg pain as well as generalized osteoarthritic aches and pains.  The patient does have pain in the area of the right anterior iliac spine and some tenderness of that area.  The patient has osteoarthritic changes in the fingers of her right hand.   Neurological:        The patient states that she has been evaluated by neurology for history of frequent falls but nothing was found.   Psychiatric/Behavioral:        The patient has a long history of mixed anxiety and depression.  Currently the patient is doing fairly well.  The patient did have to put her  in a nursing home because of advancing dementia.  This of course causes some stress.       Physical Exam   Constitutional: She is oriented to person, place, and time. She appears well-developed and well-nourished.   Overweight   Neck:   Carotid pulses normal   Cardiovascular: Normal rate, regular rhythm and normal heart sounds.    Pulmonary/Chest: Effort normal and breath sounds normal. No respiratory distress. She has no wheezes. She has no rales.   Abdominal: Soft. Bowel sounds are normal. She exhibits no distension and no mass. There is no tenderness.   Musculoskeletal:   Mild tenderness in the anterior iliac spine area.  Osteoarthritic changes in multiple joints.  Decreased range of motion in the low back area.   Neurological: She is alert and oriented to person, place, and time.   Skin: Skin is warm and dry.   Psychiatric: She has a normal mood and affect.   Nursing note and vitals reviewed.      ASSESSMENT  #1 type 2 diabetes mellitus           #2 essential hypertension           #3 hyperlipidemia         #4 history of mild anemia and leukopenia          #5 chronic coronary artery disease        #6 chronic kidney disease      DISCUSSION/SUMMARY   The patient's initial blood pressure was slightly elevated and upon recheck it was  down to 130/74.  Due to a mixup the patient thought she was coming in today just for labs.  She has had her labs drawn and we will call her the results of her CMP, lipid panel, HbA1c, and CBC.  I told the patient to contact her orthopedic surgeon for evaluation of her right anterior iliac spine tenderness and pain.  The patient has an appointment with an infectious disease specialist because of her history of recurrent urinary tract infections.    PLAN  Recheck in 6 months with fasting CMP, lipid panel, HbA1c and CBC  No Follow-up on file.

## 2017-09-26 RX ORDER — ISOSORBIDE MONONITRATE 30 MG/1
TABLET, EXTENDED RELEASE ORAL
Qty: 90 TABLET | Refills: 3 | Status: SHIPPED | OUTPATIENT
Start: 2017-09-26 | End: 2018-06-11 | Stop reason: SDUPTHER

## 2017-09-29 ENCOUNTER — TELEPHONE (OUTPATIENT)
Dept: FAMILY MEDICINE CLINIC | Facility: CLINIC | Age: 67
End: 2017-09-29

## 2017-09-29 NOTE — TELEPHONE ENCOUNTER
Done. Patient aware.    ----- Message from Lissa Pavon sent at 9/25/2017  9:51 AM EDT -----  PT WANTS LABS FAXED TO DR GIRALDO AT [facility] [phone]

## 2017-09-29 NOTE — TELEPHONE ENCOUNTER
Patient notified    ----- Message from Logan Dhaliwal MD sent at 9/26/2017 12:58 PM EDT -----  Tell patient that her blood sugar is under reasonable control.  The cholesterol is also under reasonable control.  Tell her that her kidney functions remain mildly abnormal.  Tell her that she should be drinking  plenty of water every day..  The patient's blood count again shows very mild anemia but it is stable.  Tell her that 2 of her liver enzymes are mildly elevated as they have been in the past on occasion, probably from cholesterol medicine.  Tell her that she should limit her alcohol intake however.  I had told her that I wanted to see her again in 6 months but given the above test, I think I would like to see her in 4 months instead.

## 2017-11-06 RX ORDER — SERTRALINE HYDROCHLORIDE 100 MG/1
TABLET, FILM COATED ORAL
Qty: 180 TABLET | Refills: 1 | Status: SHIPPED | OUTPATIENT
Start: 2017-11-06 | End: 2018-05-19 | Stop reason: SDUPTHER

## 2017-11-06 RX ORDER — LOSARTAN POTASSIUM 100 MG/1
TABLET ORAL
Qty: 90 TABLET | Refills: 1 | Status: SHIPPED | OUTPATIENT
Start: 2017-11-06 | End: 2018-05-11 | Stop reason: SDUPTHER

## 2017-11-07 ENCOUNTER — OFFICE VISIT (OUTPATIENT)
Dept: FAMILY MEDICINE CLINIC | Facility: CLINIC | Age: 67
End: 2017-11-07

## 2017-11-07 VITALS
TEMPERATURE: 98.8 F | BODY MASS INDEX: 32.6 KG/M2 | RESPIRATION RATE: 16 BRPM | SYSTOLIC BLOOD PRESSURE: 134 MMHG | OXYGEN SATURATION: 98 % | HEART RATE: 81 BPM | DIASTOLIC BLOOD PRESSURE: 66 MMHG | HEIGHT: 63 IN | WEIGHT: 184 LBS

## 2017-11-07 DIAGNOSIS — R51.9 HEADACHE IN FRONT OF HEAD: ICD-10-CM

## 2017-11-07 DIAGNOSIS — J30.2 ACUTE SEASONAL ALLERGIC RHINITIS, UNSPECIFIED TRIGGER: Primary | ICD-10-CM

## 2017-11-07 PROCEDURE — 99213 OFFICE O/P EST LOW 20 MIN: CPT | Performed by: NURSE PRACTITIONER

## 2017-11-07 RX ORDER — PROMETHAZINE HYDROCHLORIDE AND CODEINE PHOSPHATE 6.25; 1 MG/5ML; MG/5ML
5 SYRUP ORAL EVERY 4 HOURS PRN
Qty: 180 ML | Refills: 0 | Status: SHIPPED | OUTPATIENT
Start: 2017-11-07 | End: 2018-05-25 | Stop reason: SDUPTHER

## 2017-11-07 RX ORDER — CEPHALEXIN 250 MG/1
CAPSULE ORAL
COMMUNITY
Start: 2017-10-31 | End: 2018-06-11

## 2017-11-07 RX ORDER — VALACYCLOVIR HYDROCHLORIDE 1 G/1
TABLET, FILM COATED ORAL
COMMUNITY
Start: 2017-11-04 | End: 2018-06-11 | Stop reason: SDUPTHER

## 2017-11-07 NOTE — PROGRESS NOTES
Subjective   Paty Hernandez is a 67 y.o. female.   Chief Complaint   Patient presents with   • Headache     pt states having headaches at night,    • Cough   • Fatigue     Vitals:    11/07/17 1427   BP: 134/66   Pulse: 81   Resp: 16   Temp: 98.8 °F (37.1 °C)   SpO2: 98%     No LMP recorded (lmp unknown). Patient is postmenopausal.    History of Present Illness  Paty is a patient of Dr Dhaliwal who is here for an acute visit. She c/o frontal headache for the past few days that occur at night. She describes the pain as pressure and it does not radiate. She denies any associated symptoms such as visual disturbance, photophobia, and phonophobia. She has taken tylenol with relief. She also has had a non productive cough and fatigue. She denies fever, chills or body aches.     The following portions of the patient's history were reviewed and updated as appropriate: allergies, current medications, past family history, past medical history, past social history, past surgical history and problem list.    Review of Systems   Constitutional: Positive for fatigue. Negative for chills and fever.   HENT: Positive for postnasal drip and sore throat. Negative for sinus pain and sinus pressure.    Respiratory: Positive for cough. Negative for shortness of breath and wheezing.    Cardiovascular: Negative.    Genitourinary:        Frequent UTI    Musculoskeletal: Negative for arthralgias.   Neurological: Positive for headaches.       Objective   Physical Exam   Constitutional: Vital signs are normal. She appears well-developed and well-nourished. No distress.   HENT:   Right Ear: Tympanic membrane and ear canal normal.   Left Ear: Ear canal normal. Tympanic membrane is bulging.   Nose: Mucosal edema and rhinorrhea present. Right sinus exhibits no maxillary sinus tenderness and no frontal sinus tenderness. Left sinus exhibits no maxillary sinus tenderness and no frontal sinus tenderness.   Mouth/Throat: Uvula is midline and mucous  membranes are normal. Posterior oropharyngeal erythema (post nasal drip noted in the posterior pharynx ) present.   Cardiovascular: Normal rate and regular rhythm.    Pulmonary/Chest: Effort normal and breath sounds normal.   Neurological: She is alert.   Skin: Skin is warm and dry.       Assessment/Plan   Paty was seen today for headache, cough and fatigue.    Diagnoses and all orders for this visit:    Acute seasonal allergic rhinitis, unspecified trigger    Headache in front of head    Other orders  -     promethazine-codeine (PHENERGAN with CODEINE) 6.25-10 MG/5ML syrup; Take 5 mL by mouth Every 4 (Four) Hours As Needed for Cough.    Recommend flonase or nasocort otc  Rest and fluids  Tylenol as needed  Promethazine with codeine for nighttime cough  Follow up if symptoms persist, worsen, or if new symptoms develop

## 2017-11-14 RX ORDER — ALPRAZOLAM 1 MG/1
1 TABLET ORAL 3 TIMES DAILY PRN
Qty: 90 TABLET | Refills: 3 | Status: SHIPPED | OUTPATIENT
Start: 2017-11-14 | End: 2018-05-22 | Stop reason: SDUPTHER

## 2017-11-17 RX ORDER — FUROSEMIDE 40 MG/1
TABLET ORAL
Qty: 90 TABLET | Refills: 1 | Status: SHIPPED | OUTPATIENT
Start: 2017-11-17 | End: 2018-05-19 | Stop reason: SDUPTHER

## 2017-11-17 RX ORDER — CARVEDILOL 6.25 MG/1
TABLET ORAL
Qty: 90 TABLET | Refills: 1 | Status: SHIPPED | OUTPATIENT
Start: 2017-11-17 | End: 2018-05-19 | Stop reason: SDUPTHER

## 2017-12-26 ENCOUNTER — TELEPHONE (OUTPATIENT)
Dept: FAMILY MEDICINE CLINIC | Facility: CLINIC | Age: 67
End: 2017-12-26

## 2017-12-26 NOTE — TELEPHONE ENCOUNTER
CALLED AND GAVE VERBAL ORDER, WILL BE COMING UNDERNEATH DR. DAVILA NAME BECAUSE DR. MOJICA IS OUT OF OFFICE THIS WEEK.   ----- Message from Gisslel Estrella MA sent at 12/26/2017  3:03 PM EST -----  Contact: ISIDRO WITH [facility]  PT IS BEING DISCHARGED [facility]  TODAY / ISIDRO WANTS VERBAL ORDER FOR SKILLED NURSING / ISIDRO CAN BE REACHED AT THE NUMBER BELOW          [phone]

## 2017-12-28 ENCOUNTER — TELEPHONE (OUTPATIENT)
Dept: FAMILY MEDICINE CLINIC | Facility: CLINIC | Age: 67
End: 2017-12-28

## 2017-12-28 NOTE — TELEPHONE ENCOUNTER
CALLED PATIENT AND NOTIFIED HER THAT IT WAS OK TO TAKE IMMODIUM AD PRN DIARRHEA.     ----- Message from Radha Thompson sent at 12/28/2017 12:32 PM EST -----  PT HAVING LOOSE STOOL AND WAS JUST RELEASED FROM THE HOSPITAL FOR A FALL AND SHE WANTED TO KNOW IF SHE SHOULD BEEP TAKING HER IMODIUM.    PLEASE CALL PT ABOUT THIS. PT# [phone]

## 2018-01-02 ENCOUNTER — TELEPHONE (OUTPATIENT)
Dept: SURGERY | Facility: CLINIC | Age: 68
End: 2018-01-02

## 2018-01-02 NOTE — TELEPHONE ENCOUNTER
Pt was recently in [place] for head injury and pyelonephritis. Was told she isn't producing enough blood? She has been told to take Cephalexin 250 mg QHS. Pt is scared because of her recent cdiff. Wants to know what you suggest.

## 2018-01-11 ENCOUNTER — OFFICE VISIT (OUTPATIENT)
Dept: FAMILY MEDICINE CLINIC | Facility: CLINIC | Age: 68
End: 2018-01-11

## 2018-01-11 VITALS
BODY MASS INDEX: 32.25 KG/M2 | OXYGEN SATURATION: 97 % | SYSTOLIC BLOOD PRESSURE: 130 MMHG | DIASTOLIC BLOOD PRESSURE: 80 MMHG | HEIGHT: 63 IN | TEMPERATURE: 98.3 F | HEART RATE: 76 BPM | WEIGHT: 182 LBS | RESPIRATION RATE: 18 BRPM

## 2018-01-11 DIAGNOSIS — Z87.898 H/O BALANCE DISORDER: Primary | ICD-10-CM

## 2018-01-11 DIAGNOSIS — Z87.440 HISTORY OF FREQUENT URINARY TRACT INFECTIONS: ICD-10-CM

## 2018-01-11 PROBLEM — S22.080S: Status: ACTIVE | Noted: 2018-01-11

## 2018-01-11 PROCEDURE — 99213 OFFICE O/P EST LOW 20 MIN: CPT

## 2018-01-11 RX ORDER — RANITIDINE 150 MG/1
150 CAPSULE ORAL 2 TIMES DAILY
Qty: 60 CAPSULE | Refills: 11 | Status: SHIPPED | OUTPATIENT
Start: 2018-01-11 | End: 2018-02-09 | Stop reason: ALTCHOICE

## 2018-01-11 RX ORDER — L. ACIDOPHILUS/L.BULGARICUS 1MM CELL
1 TABLET ORAL 3 TIMES DAILY
COMMUNITY
End: 2019-04-30

## 2018-01-11 NOTE — PROGRESS NOTES
Hugo Hernandez is a 67 y.o. female. Patient is here today for   Chief Complaint   Patient presents with   • Follow-up          Vitals:    01/11/18 1027   BP: 130/80   Pulse: 76   Resp: 18   Temp: 98.3 °F (36.8 °C)   SpO2: 97%     The following portions of the patient's history were reviewed and updated as appropriate: allergies, current medications, past family history, past medical history, past social history, past surgical history and problem list.    Past Medical History:   Diagnosis Date   • Abdominal pain, acute 06/04/2015    SEEN AT [facility] ER   • Acute back pain 08/07/2008    SEEN AT [facility] ER   • Acute bronchitis 03/26/2005    SEEN AT [facility] ER   • Acute constipation 05/17/2015    SEEN AT [facility] ER   • Acute lateral meniscus tear of left knee 11/2013   • Adjustment disorder 12/24/2015    SEEN AT [facility] ER   • Allergic rhinitis 08/11/2015    SEES    • Anemia    • Anxiety    • Arthritis    • Asthmatic bronchitis    • Breast mass, right    • Bronchitis    • CAD (coronary artery disease)    • Cervico-occipital neuralgia    • Chronic idiopathic constipation    • Chronic maxillary sinusitis 08/11/2015    SEES    • Chronic pain    • CKD (chronic kidney disease)     STAGE II   • Colon polyps    • Cough    • DDD (degenerative disc disease), lumbar    • Depression    • Diabetes mellitus     TYPE 2   • Dizziness    • Fall 05/15/2013    CONTUSION BILATERAL KNEES, SEEN AT [facility] ER   • Fracture of phalanx of finger of left hand 05/15/2013    4TH AND 5TH PROXIMAL, D/T FALL, SEEN AT [facility] ER   • GERD (gastroesophageal reflux disease)    • Heart burn    • HLD (hyperlipidemia)    • Hypertension    • Irritable bowel syndrome    • Laceration of finger, index 09/01/2007    RIGHT HAND, SEEN AT [facility]   • Left knee injury 11/05/2013    SEEN AT [facility] ER   • Low back pain    • Lumbar radiculopathy    • Migraine headache    • Mixed anxiety and depressive disorder    • OA (osteoarthritis)    • KEYSHA (obstructive sleep apnea)    •  Osteoarthritis    • Pes anserine bursitis    • Recurrent bacterial cystitis    • Spinal stenosis of lumbar region    • UTI (urinary tract infection)       Allergies   Allergen Reactions   • Contrast Dye      Due to renal insufficiency not d/t a reaction   • Iodinated Diagnostic Agents      Due to renal insufficiency not d/t a reaction   • Niacin And Related    • Sulfa Antibiotics    • Chloraprep One Step [Chlorhexidine Gluconate] Rash     Blistery Rash      Social History     Social History   • Marital status:      Spouse name: N/A   • Number of children: N/A   • Years of education: N/A     Occupational History   • Not on file.     Social History Main Topics   • Smoking status: Former Smoker     Types: Cigarettes     Quit date: 7/17/1990   • Smokeless tobacco: Never Used   • Alcohol use Yes      Comment: SOCIAL   • Drug use: No   • Sexual activity: Not on file     Other Topics Concern   • Not on file     Social History Narrative        Current Outpatient Prescriptions:   •  acidophilus (FLORANEX) tablet tablet, Take  by mouth 3 (Three) Times a Day., Disp: , Rfl:   •  ALPRAZolam (XANAX) 1 MG tablet, Take 1 tablet by mouth 3 (Three) Times a Day As Needed for Anxiety., Disp: 90 tablet, Rfl: 3  •  Ascorbic Acid (VITAMIN C PO), Take 1 tablet/day by mouth daily., Disp: , Rfl:   •  aspirin 81 MG tablet, Take 81 mg by mouth daily., Disp: , Rfl:   •  carvedilol (COREG) 6.25 MG tablet, TAKE ONE TABLET BY MOUTH DAILY, Disp: 90 tablet, Rfl: 1  •  cephalexin (KEFLEX) 250 MG capsule, , Disp: , Rfl:   •  Cetirizine HCl (ZYRTEC ALLERGY) 10 MG capsule, Take 10 mg by mouth daily., Disp: , Rfl:   •  cholecalciferol (VITAMIN D3) 1000 UNITS tablet, Take 1,000 Units by mouth daily., Disp: , Rfl:   •  dexamethasone sodium phosphate 120 MG/30ML solution injection, , Disp: , Rfl:   •  FIBER PO, Take  by mouth daily., Disp: , Rfl:   •  fluticasone-salmeterol (ADVAIR) 100-50 MCG/DOSE DISKUS, Inhale 1 puff., Disp: , Rfl:   •  furosemide  (LASIX) 40 MG tablet, TAKE ONE TABLET BY MOUTH DAILY, Disp: 90 tablet, Rfl: 1  •  isosorbide mononitrate (IMDUR) 30 MG 24 hr tablet, TAKE ONE TABLET BY MOUTH DAILY, Disp: 90 tablet, Rfl: 3  •  JANUVIA 50 MG tablet, TAKE ONE TABLET BY MOUTH DAILY, Disp: 30 tablet, Rfl: 4  •  losartan (COZAAR) 100 MG tablet, TAKE ONE TABLET BY MOUTH DAILY, Disp: 90 tablet, Rfl: 1  •  montelukast (SINGULAIR) 10 MG tablet, TAKE ONE TABLET BY MOUTH DAILY, Disp: 90 tablet, Rfl: 3  •  MONUROL 3 G pack, , Disp: , Rfl:   •  Multiple Vitamins-Minerals (MULTIVITAMIN ADULT PO), Take 1 tablet by mouth daily., Disp: , Rfl:   •  omega-3 acid ethyl esters (LOVAZA) 1 G capsule, Take 1 g by mouth., Disp: , Rfl:   •  omeprazole (priLOSEC) 20 MG capsule, TAKE ONE CAPSULE BY MOUTH EVERY MORNING, Disp: 90 capsule, Rfl: 3  •  promethazine-codeine (PHENERGAN with CODEINE) 6.25-10 MG/5ML syrup, Take 5 mL by mouth Every 4 (Four) Hours As Needed for Cough., Disp: 180 mL, Rfl: 0  •  rosuvastatin (CRESTOR) 10 MG tablet, , Disp: , Rfl:   •  sertraline (ZOLOFT) 100 MG tablet, TAKE TWO TABLETS BY MOUTH EVERY NIGHT AT BEDTIME, Disp: 180 tablet, Rfl: 1  •  tiZANidine (ZANAFLEX) 4 MG tablet, Take 4 mg by mouth daily as needed., Disp: , Rfl:   •  triamcinolone (KENALOG) 0.1 % ointment, , Disp: , Rfl:   •  valACYclovir (VALTREX) 1000 MG tablet, , Disp: , Rfl:   •  vitamin A 27879 UNIT capsule, Take 10,000 Units by mouth daily., Disp: , Rfl:   •  vitamin B-6 (PYRIDOXINE) 100 MG tablet, Take  by mouth daily., Disp: , Rfl:   •  ranitidine (ZANTAC) 150 MG capsule, Take 1 capsule by mouth 2 (Two) Times a Day., Disp: 60 capsule, Rfl: 11     Objective     History of Present Illness   The patient is here today for follow-up on a mild thoracic compression fracture after a fall as well as a urinary tract infection for which the patient was hospitalized from 12/22 to 12/26/17    Review of Systems   Constitutional: Negative for chills and fever.   Respiratory: Negative for cough,  shortness of breath and wheezing.    Cardiovascular: Negative for chest pain, palpitations and leg swelling.   Gastrointestinal: Negative for blood in stool and nausea.        The patient has had some intermittent diarrhea which is improving.   Genitourinary:        The patient has had multiple urinary tract infections and was found to have a pyelonephritis while hospitalized on 12/22/17.  Currently the patient has no urinary symptoms.   Musculoskeletal:        The patient has some mild mid to low back pain from the mild compression fracture.  The patient does have chronic low back pain that  preceded the fall for which she was hospitalized   Neurological: Negative.    Psychiatric/Behavioral:        History of mixed anxiety depressive disorder which is under reasonable control currently       Physical Exam   Constitutional: She is oriented to person, place, and time. She appears well-developed and well-nourished.   Overweight   Eyes: Pupils are equal, round, and reactive to light.   Cardiovascular: Normal rate, regular rhythm and normal heart sounds.    Pulmonary/Chest: Effort normal and breath sounds normal. No respiratory distress. She has no wheezes. She has no rales.   Abdominal: Soft. Bowel sounds are normal.   Musculoskeletal:   Only mild palpable tenderness in the lower thoracic spine area.   Neurological: She is alert and oriented to person, place, and time.   Skin: Skin is warm and dry.   Psychiatric: She has a normal mood and affect.   Nursing note and vitals reviewed.      ASSESSMENT   #1 history of recent T12 compression fracture, mild secondary to fall               #2 pyelonephritis    DISCUSSION/SUMMARY   The patient's vital signs are normal.  The patient was hospitalized on 12/22/2017 because of back pain following a fall on 12/20/17.  The patient states that late in the evening heard 2 dogs got out of the house and she was chasing one of the dogs when she fell backwards and hit her back and  occipital area of her head on a concrete sidewalk.  The patient did not lose consciousness and only had minimal discomfort at that time.  The patient became more uncomfortable over the next 2 days and presented herself to the emergency room on 12/22/17 and was subsequently admitted CT scan of the head showed no acute intracranial abnormality.  There was a small occipital scalp hematoma.  MRI of the thoracic spine without contrast showed a 5% compression fracture of T12.  Coincidently there was some degenerative disc disease found at T6-T7 and T11-T12.  The urinalysis done in the hospital showed infection and culture grew out Escherichia coli and the patient subsequently received appropriate antibiotic therapy.  The patient has been chronically mildly anemic and was seen by hematologist in the hospital and they will follow the patient up for her mild anemia.  The patient is recovering well from her urinary tract infection and a small compression fracture but she is still concerned about her chronic balance problem.  Patient states that she has been clumsy all her life but in recent years has been more susceptible to falls.  She actually saw a neurologist about 5 years ago for a workup.  I do not have the results of that workup.  The patient states that there was some questionable problem on her brain either MRI or CT.  The CT she recently had in the hospital did not show any acute intracranial pathology.  The patient wishes to see a neurologist again because of her chronic balance problem and history of multiple falls.  She will try to get me information about this so I can refer her back to the same group.  The patient will continue to be followed by her urologist and nephrologist for her urinary tract infections and mild renal insufficiency.    PLAN  The patient has an appointment with me in March with labs.  No Follow-up on file.

## 2018-01-15 DIAGNOSIS — G44.009 CLUSTER HEADACHE, NOT INTRACTABLE, UNSPECIFIED CHRONICITY PATTERN: ICD-10-CM

## 2018-01-15 DIAGNOSIS — M54.81 CERVICO-OCCIPITAL NEURALGIA: Primary | ICD-10-CM

## 2018-01-16 DIAGNOSIS — R26.89 BALANCE DISORDER: Primary | ICD-10-CM

## 2018-01-17 ENCOUNTER — TELEPHONE (OUTPATIENT)
Dept: FAMILY MEDICINE CLINIC | Facility: CLINIC | Age: 68
End: 2018-01-17

## 2018-01-17 RX ORDER — ONDANSETRON 4 MG/1
4 TABLET, FILM COATED ORAL EVERY 8 HOURS PRN
Qty: 20 TABLET | Refills: 0 | Status: SHIPPED | OUTPATIENT
Start: 2018-01-17 | End: 2018-11-10 | Stop reason: HOSPADM

## 2018-01-17 NOTE — TELEPHONE ENCOUNTER
I SENT THIS TO THE PHARMACY FOR PATIENT. PATIENT NOTIFIED.    ----- Message from Warren Mathis MA sent at 1/17/2018 10:26 AM EST -----  Contact: PT   PT STATES HAS BEEN VOMITING FOR 7 HOURS AND WOULD LIKE DR. MOJICA TO CALL HER IN [place] 4 MG. PT STATE WAS PRESCRIBED BACK IN 2015. I DID LET THE PT KNOW SHE NEEDED TO HAVE AN APPOINTMENT. HOWEVER PT REFUSED AND STATED SHE WOULD NOT COME IN EVEN IF SHE NEEDED ONE. SENDING A MESSAGE PER PT'S REQUEST. PLEASE CALL PT BACK @ [phone]. PLEASE SEND TO [facility] [employer].

## 2018-01-19 ENCOUNTER — TELEPHONE (OUTPATIENT)
Dept: FAMILY MEDICINE CLINIC | Facility: CLINIC | Age: 68
End: 2018-01-19

## 2018-01-19 NOTE — TELEPHONE ENCOUNTER
Patient notified    ----- Message from Logan Dhaliwal MD sent at 1/19/2018 11:52 AM EST -----  Contact: pt  YES, she may take Tylenol in its regular dose as well as the muscle relaxant for her headache  ----- Message -----     From: Martine Brizuela MA     Sent: 1/19/2018  10:28 AM       To: Logan Dhaliwal MD        ----- Message -----     From: Imtiaz Pedroza     Sent: 1/19/2018  10:06 AM       To: Martine Brizuela MA    PT CALLED SAID SHE HAD DIARRHEA & VOMITING WHICH SHE WAS GIVEN ZOFRAM FOR WHICH HELPED. PT NOW SAYS SHE HAS A VERY BAD HEADACHE AND ASKING IF SHE CAN TAKE TYLENOL AND OR ONE OF HER MUSCLE RELAXERS.     PT'S # [phone]    OBDULIO RIVAS

## 2018-02-07 ENCOUNTER — TELEPHONE (OUTPATIENT)
Dept: FAMILY MEDICINE CLINIC | Facility: CLINIC | Age: 68
End: 2018-02-07

## 2018-02-07 NOTE — TELEPHONE ENCOUNTER
LEFT VOICEMAIL WITH LIZ LETTING HER KNOW PATIENT WILL NEED AN APPOINTMENT REGARDING THIS ISSUE. SHE ALREADY HAD XRAYS/MRI/CT SCAN DONE IN December 2017 AT [facility].    ----- Message from Imtiaz Cuellar sent at 2/7/2018 12:59 PM EST -----  LIZ WITH [facility] CALLED IN STATING PT COMPLAINING OF INCREASED PAIN IN HER MID BACK WITH ACTIVITY.  AS LONG AS SHE IS SITTING NO PAIN BUT AS SOON AS SHE TRIES TO MOVE OR GET UP SHE IS IN PAIN STATING A LEVEL OF 8 OR 9.  LIZ WANTS TO KNOW IF DR WANTS TO PURSUE WITH ANY TYPE OF DIAGNOSTICS TEST OR IF HE WANT PT OR OT  TO COME IN AND EVALUATE.  PLEASE CONTACT LIZ ON HOW TO PROCEED [phone].

## 2018-02-09 ENCOUNTER — OFFICE VISIT (OUTPATIENT)
Dept: FAMILY MEDICINE CLINIC | Facility: CLINIC | Age: 68
End: 2018-02-09

## 2018-02-09 VITALS
RESPIRATION RATE: 18 BRPM | TEMPERATURE: 98.1 F | DIASTOLIC BLOOD PRESSURE: 70 MMHG | WEIGHT: 180 LBS | HEIGHT: 63 IN | SYSTOLIC BLOOD PRESSURE: 142 MMHG | OXYGEN SATURATION: 99 % | HEART RATE: 78 BPM | BODY MASS INDEX: 31.89 KG/M2

## 2018-02-09 DIAGNOSIS — M54.50 ACUTE LOW BACK PAIN WITHOUT SCIATICA, UNSPECIFIED BACK PAIN LATERALITY: ICD-10-CM

## 2018-02-09 DIAGNOSIS — M54.9 MID BACK PAIN: Primary | ICD-10-CM

## 2018-02-09 DIAGNOSIS — M54.5 LOW BACK PAIN, UNSPECIFIED BACK PAIN LATERALITY, UNSPECIFIED CHRONICITY, WITH SCIATICA PRESENCE UNSPECIFIED: ICD-10-CM

## 2018-02-09 DIAGNOSIS — M54.9 ACUTE MID BACK PAIN: Primary | ICD-10-CM

## 2018-02-09 PROCEDURE — 99213 OFFICE O/P EST LOW 20 MIN: CPT
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
## 2018-02-15 ENCOUNTER — TELEPHONE (OUTPATIENT)
Dept: FAMILY MEDICINE CLINIC | Facility: CLINIC | Age: 68
End: 2018-02-15

## 2018-02-15 NOTE — TELEPHONE ENCOUNTER
Patient notified.    ----- Message from Logan Dhaliwal MD sent at 2/14/2018  1:45 PM EST -----  Tell patient that the radiologist did not see any evidence of compression fracture of her lower thoracic spine area.  She does not have any new findings on her x-ray.  I cannot tell her why the previous x-ray suggested a small compression fracture in this one does not.  If she continues to have back pain she needs to see one of the spine specialists that she has gone to before    .

## 2018-02-15 NOTE — TELEPHONE ENCOUNTER
I SPOKE WITH PATIENT REGARDING HER XRAY RESULTS    ----- Message from Radha Thompson sent at 2/15/2018 10:27 AM EST -----  PATIENT CALLED TODAY AND WAS WONDERING HER XRAY'S SHE HAD DONE LAST Friday. SHE WANTED TO KNOW IF THEY HAVE CAME BACK AND IF THEY HAVE BEEN READ. (2/9/18)    PLEASE CALL PATIENT BACK ABOUT THIS: [phone]

## 2018-03-23 DIAGNOSIS — I10 ESSENTIAL HYPERTENSION: Primary | ICD-10-CM

## 2018-03-23 DIAGNOSIS — E78.5 HYPERLIPIDEMIA, UNSPECIFIED HYPERLIPIDEMIA TYPE: ICD-10-CM

## 2018-03-23 DIAGNOSIS — E11.9 TYPE 2 DIABETES MELLITUS WITHOUT COMPLICATION, UNSPECIFIED LONG TERM INSULIN USE STATUS: ICD-10-CM

## 2018-03-23 DIAGNOSIS — Z11.59 NEED FOR HEPATITIS C SCREENING TEST: ICD-10-CM

## 2018-03-27 ENCOUNTER — TREATMENT (OUTPATIENT)
Dept: FAMILY MEDICINE CLINIC | Facility: CLINIC | Age: 68
End: 2018-03-27

## 2018-04-11 ENCOUNTER — TELEPHONE (OUTPATIENT)
Dept: FAMILY MEDICINE CLINIC | Facility: CLINIC | Age: 68
End: 2018-04-11

## 2018-04-11 DIAGNOSIS — N95.1 POST MENOPAUSAL SYNDROME: Primary | ICD-10-CM
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
## 2018-04-16 ENCOUNTER — HOSPITAL ENCOUNTER (OUTPATIENT)
Dept: BONE DENSITY | Facility: HOSPITAL | Age: 68
Discharge: HOME | End: 2018-04-16

## 2018-04-16 PROCEDURE — 77080 DXA BONE DENSITY AXIAL: CPT

## 2018-04-17 ENCOUNTER — TELEPHONE (OUTPATIENT)
Dept: FAMILY MEDICINE CLINIC | Facility: CLINIC | Age: 68
End: 2018-04-17

## 2018-04-17 NOTE — TELEPHONE ENCOUNTER
PATIENT NOTIFIED    ----- Message from Logan Dhaliwal MD sent at 4/17/2018 11:14 AM EDT -----  Tell the patient that her bone density test was actually read as normal  ----- Message -----  From: Martine Brizuela MA  Sent: 4/17/2018  11:00 AM  To: Logan Dhaliwal MD    Is there anything she needs to do regarding her Dexa scan results?    ----- Message -----  From: Radha Blanc  Sent: 4/17/2018   9:16 AM  To: Martine Brizuela MA    PATIENT CALLED AND WANTED TO GET HER DEXA RESULTS OVER THE PHONE SINCE SHE IS IN A BODY CAST.    PLEASE CALL PT BACK -[phone]

## 2018-05-11 RX ORDER — LOSARTAN POTASSIUM 100 MG/1
TABLET ORAL
Qty: 90 TABLET | Refills: 1 | Status: SHIPPED | OUTPATIENT
Start: 2018-05-11 | End: 2018-12-20 | Stop reason: SDUPTHER

## 2018-05-21 RX ORDER — ALPRAZOLAM 1 MG/1
TABLET ORAL
Qty: 90 TABLET | Refills: 4 | Status: CANCELLED | OUTPATIENT
Start: 2018-05-21

## 2018-05-22 RX ORDER — ALPRAZOLAM 1 MG/1
1 TABLET ORAL 3 TIMES DAILY PRN
Qty: 90 TABLET | Refills: 3 | Status: SHIPPED | OUTPATIENT
Start: 2018-05-22 | End: 2019-01-29 | Stop reason: SDUPTHER

## 2018-05-22 RX ORDER — FUROSEMIDE 40 MG/1
TABLET ORAL
Qty: 90 TABLET | Refills: 1 | Status: SHIPPED | OUTPATIENT
Start: 2018-05-22 | End: 2018-11-22 | Stop reason: SDUPTHER

## 2018-05-22 RX ORDER — SERTRALINE HYDROCHLORIDE 100 MG/1
TABLET, FILM COATED ORAL
Qty: 180 TABLET | Refills: 1 | Status: SHIPPED | OUTPATIENT
Start: 2018-05-22 | End: 2018-11-22 | Stop reason: SDUPTHER

## 2018-05-22 RX ORDER — CARVEDILOL 6.25 MG/1
TABLET ORAL
Qty: 90 TABLET | Refills: 1 | Status: SHIPPED | OUTPATIENT
Start: 2018-05-22 | End: 2018-11-22 | Stop reason: SDUPTHER

## 2018-05-23 ENCOUNTER — TELEPHONE (OUTPATIENT)
Dept: FAMILY MEDICINE CLINIC | Facility: CLINIC | Age: 68
End: 2018-05-23

## 2018-05-25 RX ORDER — PROMETHAZINE HYDROCHLORIDE AND CODEINE PHOSPHATE 6.25; 1 MG/5ML; MG/5ML
5 SYRUP ORAL EVERY 4 HOURS PRN
Qty: 180 ML | Refills: 0 | Status: SHIPPED | OUTPATIENT
Start: 2018-05-25 | End: 2019-04-30

## 2018-05-29 ENCOUNTER — TELEPHONE (OUTPATIENT)
Dept: FAMILY MEDICINE CLINIC | Facility: CLINIC | Age: 68
End: 2018-05-29

## 2018-05-29 NOTE — TELEPHONE ENCOUNTER
Patient aware that this is ready for .    ----- Message from Warren Mathis MA sent at 5/23/2018  1:56 PM EDT -----  Contact: PT  WHEN CALLING PT, PT STATED SHE IS HAVING TO USE MORE COUGH SYRUP THEN NORMAL DUE TO POLYUP AND WAS WANTING TO KNOW IF HE COULD REFILL HER COUGH SYRUP promethazine-codeine (PHENERGAN with CODEINE) 6.25-10 MG/5ML, SHE TAKE IT AS Take 5 mL by mouth Every 4 (Four) Hours As Needed for Cough.SHE SAID SHE HAS GONE THE 3/4 OF IT ALREADY. PLEASE ADVISE AND CALL PT BACK @ [phone]. THANK YOU.

## 2018-06-04 ENCOUNTER — TELEPHONE (OUTPATIENT)
Dept: FAMILY MEDICINE CLINIC | Facility: CLINIC | Age: 68
End: 2018-06-04

## 2018-06-04 DIAGNOSIS — E78.5 HYPERLIPIDEMIA, UNSPECIFIED HYPERLIPIDEMIA TYPE: ICD-10-CM

## 2018-06-04 DIAGNOSIS — I10 ESSENTIAL HYPERTENSION: Primary | ICD-10-CM

## 2018-06-04 DIAGNOSIS — E11.9 TYPE 2 DIABETES MELLITUS WITHOUT COMPLICATION, UNSPECIFIED LONG TERM INSULIN USE STATUS: ICD-10-CM

## 2018-06-04 NOTE — TELEPHONE ENCOUNTER
Left voicemail letting her know this has been done    ----- Message from Radha Blanc sent at 6/4/2018 10:20 AM EDT -----  PATIENT CALLED AND SAID SHE IS WANTING TO GO HAVE HER LABS DONE [facility] AND WANTED TO KNOW IF THE ORDERS HAVE BEEN SENT OVER. PLEASE CALL PT WHEN THEY ARE SENT SO SHE CAN GO GET THEM DONE.    PT: [phone]

## 2018-06-06 ENCOUNTER — APPOINTMENT (OUTPATIENT)
Dept: LAB | Facility: HOSPITAL | Age: 68
End: 2018-06-06

## 2018-06-06 LAB
ALBUMIN SERPL-MCNC: 4.9 G/DL (ref 3.5–5.2)
ALBUMIN/GLOB SERPL: 1.6 G/DL
ALP SERPL-CCNC: 78 U/L (ref 39–117)
ALT SERPL W P-5'-P-CCNC: 46 U/L (ref 1–33)
ANION GAP SERPL CALCULATED.3IONS-SCNC: 14.6 MMOL/L
AST SERPL-CCNC: 47 U/L (ref 1–32)
BASOPHILS # BLD AUTO: 0.01 10*3/MM3 (ref 0–0.2)
BASOPHILS NFR BLD AUTO: 0.2 % (ref 0–1.5)
BILIRUB SERPL-MCNC: 0.6 MG/DL (ref 0.1–1.2)
BUN BLD-MCNC: 29 MG/DL (ref 8–23)
BUN/CREAT SERPL: 18.6 (ref 7–25)
CALCIUM SPEC-SCNC: 11 MG/DL (ref 8.6–10.5)
CHLORIDE SERPL-SCNC: 99 MMOL/L (ref 98–107)
CHOLEST SERPL-MCNC: 347 MG/DL (ref 0–200)
CO2 SERPL-SCNC: 26.4 MMOL/L (ref 22–29)
CREAT BLD-MCNC: 1.56 MG/DL (ref 0.57–1)
DEPRECATED RDW RBC AUTO: 52.3 FL (ref 37–54)
EOSINOPHIL # BLD AUTO: 0.11 10*3/MM3 (ref 0–0.7)
EOSINOPHIL NFR BLD AUTO: 2.2 % (ref 0.3–6.2)
ERYTHROCYTE [DISTWIDTH] IN BLOOD BY AUTOMATED COUNT: 14.6 % (ref 11.7–13)
GFR SERPL CREATININE-BSD FRML MDRD: 33 ML/MIN/1.73
GLOBULIN UR ELPH-MCNC: 3 GM/DL
GLUCOSE BLD-MCNC: 167 MG/DL (ref 65–99)
HBA1C MFR BLD: 6.5 % (ref 4.8–5.6)
HCT VFR BLD AUTO: 38.4 % (ref 35.6–45.5)
HDLC SERPL-MCNC: 67 MG/DL (ref 40–60)
HGB BLD-MCNC: 13.2 G/DL (ref 11.9–15.5)
IMM GRANULOCYTES # BLD: 0 10*3/MM3 (ref 0–0.03)
IMM GRANULOCYTES NFR BLD: 0 % (ref 0–0.5)
LDLC SERPL CALC-MCNC: 217 MG/DL (ref 0–100)
LDLC/HDLC SERPL: 3.24 {RATIO}
LYMPHOCYTES # BLD AUTO: 1.26 10*3/MM3 (ref 0.9–4.8)
LYMPHOCYTES NFR BLD AUTO: 25.5 % (ref 19.6–45.3)
MCH RBC QN AUTO: 33.4 PG (ref 26.9–32)
MCHC RBC AUTO-ENTMCNC: 34.4 G/DL (ref 32.4–36.3)
MCV RBC AUTO: 97.2 FL (ref 80.5–98.2)
MONOCYTES # BLD AUTO: 0.38 10*3/MM3 (ref 0.2–1.2)
MONOCYTES NFR BLD AUTO: 7.7 % (ref 5–12)
NEUTROPHILS # BLD AUTO: 3.18 10*3/MM3 (ref 1.9–8.1)
NEUTROPHILS NFR BLD AUTO: 64.4 % (ref 42.7–76)
PLATELET # BLD AUTO: 124 10*3/MM3 (ref 140–500)
PMV BLD AUTO: 11.3 FL (ref 6–12)
POTASSIUM BLD-SCNC: 4.7 MMOL/L (ref 3.5–5.2)
PROT SERPL-MCNC: 7.9 G/DL (ref 6–8.5)
RBC # BLD AUTO: 3.95 10*6/MM3 (ref 3.9–5.2)
SODIUM BLD-SCNC: 140 MMOL/L (ref 136–145)
TRIGL SERPL-MCNC: 314 MG/DL (ref 0–150)
VLDLC SERPL-MCNC: 62.8 MG/DL (ref 5–40)
WBC NRBC COR # BLD: 4.94 10*3/MM3 (ref 4.5–10.7)

## 2018-06-06 PROCEDURE — 85025 COMPLETE CBC W/AUTO DIFF WBC: CPT

## 2018-06-06 PROCEDURE — 80053 COMPREHEN METABOLIC PANEL: CPT

## 2018-06-06 PROCEDURE — 83036 HEMOGLOBIN GLYCOSYLATED A1C: CPT

## 2018-06-06 PROCEDURE — 36415 COLL VENOUS BLD VENIPUNCTURE: CPT

## 2018-06-06 PROCEDURE — 80061 LIPID PANEL: CPT

## 2018-06-11 ENCOUNTER — OFFICE VISIT (OUTPATIENT)
Dept: FAMILY MEDICINE CLINIC | Facility: CLINIC | Age: 68
End: 2018-06-11

## 2018-06-11 VITALS
DIASTOLIC BLOOD PRESSURE: 74 MMHG | BODY MASS INDEX: 32.25 KG/M2 | WEIGHT: 182 LBS | HEIGHT: 63 IN | OXYGEN SATURATION: 95 % | RESPIRATION RATE: 18 BRPM | HEART RATE: 79 BPM | SYSTOLIC BLOOD PRESSURE: 122 MMHG

## 2018-06-11 DIAGNOSIS — N18.30 STAGE 3 CHRONIC KIDNEY DISEASE (HCC): ICD-10-CM

## 2018-06-11 DIAGNOSIS — I10 ESSENTIAL HYPERTENSION: ICD-10-CM

## 2018-06-11 DIAGNOSIS — I25.10 CORONARY ARTERY DISEASE INVOLVING NATIVE HEART WITHOUT ANGINA PECTORIS, UNSPECIFIED VESSEL OR LESION TYPE: ICD-10-CM

## 2018-06-11 DIAGNOSIS — E78.2 MIXED HYPERLIPIDEMIA: ICD-10-CM

## 2018-06-11 DIAGNOSIS — E11.21 TYPE 2 DIABETES MELLITUS WITH DIABETIC NEPHROPATHY, WITHOUT LONG-TERM CURRENT USE OF INSULIN (HCC): Primary | ICD-10-CM

## 2018-06-11 PROCEDURE — 99213 OFFICE O/P EST LOW 20 MIN: CPT

## 2018-06-11 RX ORDER — ISOSORBIDE MONONITRATE 30 MG/1
30 TABLET, EXTENDED RELEASE ORAL DAILY
Qty: 90 TABLET | Refills: 3 | Status: SHIPPED | OUTPATIENT
Start: 2018-06-11 | End: 2019-08-29 | Stop reason: SDUPTHER

## 2018-06-11 RX ORDER — RANITIDINE 150 MG/1
150 CAPSULE ORAL EVERY EVENING
COMMUNITY
Start: 2018-04-09 | End: 2019-04-30

## 2018-06-11 RX ORDER — MONTELUKAST SODIUM 10 MG/1
10 TABLET ORAL DAILY
Qty: 90 TABLET | Refills: 3 | Status: SHIPPED | OUTPATIENT
Start: 2018-06-11 | End: 2019-04-30

## 2018-06-11 RX ORDER — ROSUVASTATIN CALCIUM 10 MG/1
10 TABLET, COATED ORAL DAILY
Qty: 90 TABLET | Refills: 3 | Status: SHIPPED | OUTPATIENT
Start: 2018-06-11 | End: 2019-04-02 | Stop reason: SDUPTHER

## 2018-06-11 RX ORDER — VALACYCLOVIR HYDROCHLORIDE 1 G/1
1000 TABLET, FILM COATED ORAL DAILY
Qty: 90 TABLET | Refills: 3 | Status: SHIPPED | OUTPATIENT
Start: 2018-06-11 | End: 2019-11-05 | Stop reason: SDUPTHER
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
## 2018-06-14 ENCOUNTER — TELEPHONE (OUTPATIENT)
Dept: FAMILY MEDICINE CLINIC | Facility: CLINIC | Age: 68
End: 2018-06-14

## 2018-06-14 NOTE — TELEPHONE ENCOUNTER
I spoke with patient and let her know if she's feeling bad that she should go to the ER/urgent care. She discussed wanting to get her labs rechecked sooner because because they were so far out of range and she thinks it's an error. I told her Dr. Dhaliwal didn't insist on rechecking them again for another 3 months but that she could call [facility] and inquire on the pricing of how much it would cost her since insurance likely would not cover the labs being redrawn so soon. She said she would check and call us back.    ----- Message from Lissa Pavon sent at 6/14/2018  8:03 AM EDT -----  HAVING HEADACHES FOR 2 DAYS  CHL IS STILL HIGH AND GETTING HIGHER     DOESN'T FEEL LIKE HERSELF     SAYS HER  WHEN SHE GOT HER LAB RESULTS     AND SHE DOESN'T KNOW WHATS GOING ON     IS THERE ANYTHING SHE CAN GET HER LABS DONE SOONER THAN AUG ,    PLEASE CALL PT TO DISCUSS     [phone]

## 2018-06-15 ENCOUNTER — TELEPHONE (OUTPATIENT)
Dept: FAMILY MEDICINE CLINIC | Facility: CLINIC | Age: 68
End: 2018-06-15

## 2018-06-15 DIAGNOSIS — E78.5 HYPERLIPIDEMIA, UNSPECIFIED HYPERLIPIDEMIA TYPE: Primary | ICD-10-CM

## 2018-06-15 NOTE — TELEPHONE ENCOUNTER
I spoke with patient and let her know Dr. Dhaliwal said it was OK to recheck her cholesterol if she wants; order is put in [facility] and she is aware she can go over to [facility] and get her lab drawn. She was told to fast.    ----- Message from Lissa Pavon sent at 6/14/2018 11:57 AM EDT -----  CAN GET HER LABS FREE AT [facility]       PLEASE CALL PT  SHE HAS ALL THE DETAILS AND WANTED TO SPEAK TO NISHANT       [phone]

## 2018-06-15 NOTE — TELEPHONE ENCOUNTER
Called patient and notified her that Dr. Dhaliwal said she can take extra strength Tylenol up to 6 x day for hear headaches or she can take Excedrin.  Dr. Dhaliwal also stated she can take Carvedilol 6.25mg BID for her elevated BP.    Patient understood.     ----- Message from Martine Brizuela MA sent at 6/15/2018  2:10 PM EDT -----  Contact: ZOILA      ----- Message -----  From: Cady Vasquez MA  Sent: 6/15/2018  11:17 AM  To: Martine Brizuela MA    PATIENT HAS HAD A HEADACHE SINCE Wednesday HER B/P YESTERDAY /104 AND TODAY IT /98 SHE WANTS TO SEE IF DR DHALIWAL CAN CALL IN SOMETHING FOR THE HEADACHE  PLEASE REVIEW AND ADVISE

## 2018-06-18 ENCOUNTER — APPOINTMENT (OUTPATIENT)
Dept: LAB | Facility: HOSPITAL | Age: 68
End: 2018-06-18

## 2018-06-18 LAB
CHOLEST SERPL-MCNC: 258 MG/DL (ref 0–200)
HDLC SERPL-MCNC: 53 MG/DL (ref 40–60)
LDLC SERPL CALC-MCNC: 153 MG/DL (ref 0–100)
LDLC/HDLC SERPL: 2.89 {RATIO}
TRIGL SERPL-MCNC: 259 MG/DL (ref 0–150)
VLDLC SERPL-MCNC: 51.8 MG/DL (ref 5–40)

## 2018-06-18 PROCEDURE — 36415 COLL VENOUS BLD VENIPUNCTURE: CPT

## 2018-06-19 ENCOUNTER — TELEPHONE (OUTPATIENT)
Dept: FAMILY MEDICINE CLINIC | Facility: CLINIC | Age: 68
End: 2018-06-19

## 2018-06-19 NOTE — TELEPHONE ENCOUNTER
Patient notified    ----- Message from Logan Dhaliwal MD sent at 6/19/2018  2:36 PM EDT -----  Tell patient that her cholesterol is down from 347 to 258 so it is improved.  Make sure she continues to take Crestor and we will re-check it with next labs in 6 months

## 2018-07-25 ENCOUNTER — TELEPHONE (OUTPATIENT)
Dept: FAMILY MEDICINE CLINIC | Facility: CLINIC | Age: 68
End: 2018-07-25

## 2018-07-25 NOTE — TELEPHONE ENCOUNTER
Left voicemail letting patient know this was normal.    ----- Message from Lissa Pavon sent at 7/24/2018  8:25 AM EDT -----  PT WANTING RESULTS FROM HER DEXA SCAN FROM 4/2018    CANT REMEMBER IF SHE WAS EVER NOTIFIED OF THE RESULTS   PLEASE CALL PT     [phone]

## 2018-07-26 ENCOUNTER — TELEPHONE (OUTPATIENT)
Dept: FAMILY MEDICINE CLINIC | Facility: CLINIC | Age: 68
End: 2018-07-26

## 2018-07-26 NOTE — TELEPHONE ENCOUNTER
CALLED PATIENT, SHE HAS BEEN TAKING EXCEDRIN FOR HER HEADACHES BUT IT HAS NOT REALLY HELPED HER, I NOTIFIED HER THAT THERE IS NOTHING MORE OTC SHE CAN TRY, IF IT DOES NOT GET BETTER SHE NEEDS TO GO TO URGENT CARE.   PATIENT UNDERSTOOD.     ----- Message from Martine Brizuela MA sent at 7/26/2018  2:17 PM EDT -----      ----- Message -----  From: Radha Blanc  Sent: 7/26/2018   8:09 AM  To: Martine Brizuela MA    PATIENT CALLED AND SAID SHE'S HAD A HEADACHE FOR THE LAST 3 DAYS AND THIS MORNING ITS NOT AS BAD BUT SHE CANT OPEN HER EYES MUCH AND HER EYES THAT SHE CAN SEE ARE BLOOD SHOT. SHE WANTED TO KNOW IF THERE IS SOMETHING OTC THAT SHE CAN TAKE OR WHAT SHE SHOULD DO.    PLEASE CALL PT BACK ABOUT THIS [phone]

## 2018-08-06 ENCOUNTER — APPOINTMENT (OUTPATIENT)
Dept: WOMENS IMAGING | Facility: HOSPITAL | Age: 68
End: 2018-08-06

## 2018-08-06 PROCEDURE — 77067 SCR MAMMO BI INCL CAD: CPT | Performed by: RADIOLOGY

## 2018-08-06 PROCEDURE — 77063 BREAST TOMOSYNTHESIS BI: CPT | Performed by: RADIOLOGY

## 2018-08-09 ENCOUNTER — TELEPHONE (OUTPATIENT)
Dept: FAMILY MEDICINE CLINIC | Facility: CLINIC | Age: 68
End: 2018-08-09

## 2018-08-09 NOTE — TELEPHONE ENCOUNTER
Called patient, left detailed voicemail, patient is not due for labs for another 6 months.     ----- Message from Martine Brizuela MA sent at 8/9/2018  1:59 PM EDT -----      ----- Message -----  From: Lissa Pavon  Sent: 8/9/2018   9:02 AM  To: Martine Brizuela MA    PT NEEDS LAB ORDER PUT IN [facility] PT IS GOING TO [facility] TO GET LABS DRAWN FRI 8/10/18    PLEASE CALL PT WITH ANY QUESTIONS     [phone]

## 2018-09-04 ENCOUNTER — TRANSCRIBE ORDERS (OUTPATIENT)
Dept: ADMINISTRATIVE | Facility: HOSPITAL | Age: 68
End: 2018-09-04

## 2018-09-04 DIAGNOSIS — R31.9 URINARY TRACT INFECTION WITH HEMATURIA, SITE UNSPECIFIED: Primary | ICD-10-CM

## 2018-09-04 DIAGNOSIS — N39.0 URINARY TRACT INFECTION WITH HEMATURIA, SITE UNSPECIFIED: Primary | ICD-10-CM

## 2018-09-05 ENCOUNTER — HOSPITAL ENCOUNTER (OUTPATIENT)
Dept: GENERAL RADIOLOGY | Facility: HOSPITAL | Age: 68
Discharge: HOME | End: 2018-09-05
Admitting: NURSE PRACTITIONER

## 2018-09-05 VITALS
WEIGHT: 173 LBS | BODY MASS INDEX: 30.65 KG/M2 | HEIGHT: 63 IN | RESPIRATION RATE: 16 BRPM | OXYGEN SATURATION: 99 % | DIASTOLIC BLOOD PRESSURE: 77 MMHG | HEART RATE: 68 BPM | TEMPERATURE: 97.4 F | SYSTOLIC BLOOD PRESSURE: 119 MMHG

## 2018-09-05 DIAGNOSIS — N39.0 URINARY TRACT INFECTION WITH HEMATURIA, SITE UNSPECIFIED: ICD-10-CM

## 2018-09-05 DIAGNOSIS — R31.9 URINARY TRACT INFECTION WITH HEMATURIA, SITE UNSPECIFIED: ICD-10-CM

## 2018-09-05 PROCEDURE — C1751 CATH, INF, PER/CENT/MIDLINE: HCPCS

## 2018-09-05 PROCEDURE — 96365 THER/PROPH/DIAG IV INF INIT: CPT

## 2018-09-05 PROCEDURE — 77001 FLUOROGUIDE FOR VEIN DEVICE: CPT

## 2018-09-05 PROCEDURE — 76937 US GUIDE VASCULAR ACCESS: CPT

## 2018-09-05 PROCEDURE — 25010000002 MEROPENEM: Performed by: NURSE PRACTITIONER

## 2018-09-05 RX ORDER — LIDOCAINE HYDROCHLORIDE 10 MG/ML
10 INJECTION, SOLUTION INFILTRATION; PERINEURAL ONCE
Status: COMPLETED | OUTPATIENT
Start: 2018-09-05 | End: 2018-09-05

## 2018-09-05 RX ORDER — LIDOCAINE HYDROCHLORIDE 10 MG/ML
10 INJECTION, SOLUTION INFILTRATION; PERINEURAL ONCE
Status: DISCONTINUED | OUTPATIENT
Start: 2018-09-05 | End: 2018-09-05

## 2018-09-05 RX ADMIN — MEROPENEM 500 MG: 500 INJECTION, POWDER, FOR SOLUTION INTRAVENOUS at 12:45

## 2018-09-05 RX ADMIN — LIDOCAINE HYDROCHLORIDE 5 ML: 10 INJECTION, SOLUTION INFILTRATION; PERINEURAL at 12:42

## 2018-09-05 NOTE — NURSING NOTE
Returned post picc placement in right upper arm. Drsg dry and intact, flushes well. Scheduled antbx started, lunch served. Reviewed how to use extension with home therapy. No distress.

## 2018-10-12 ENCOUNTER — PREP FOR SURGERY (OUTPATIENT)
Dept: OTHER | Facility: HOSPITAL | Age: 68
End: 2018-10-12

## 2018-10-12 DIAGNOSIS — Z83.71 FAMILY HISTORY OF POLYPS IN THE COLON: ICD-10-CM

## 2018-10-12 DIAGNOSIS — Z86.010 HISTORY OF COLON POLYPS: Primary | ICD-10-CM

## 2018-11-10 ENCOUNTER — ANESTHESIA EVENT (OUTPATIENT)
Dept: GASTROENTEROLOGY | Facility: HOSPITAL | Age: 68
End: 2018-11-10

## 2018-11-10 ENCOUNTER — HOSPITAL ENCOUNTER (OUTPATIENT)
Facility: HOSPITAL | Age: 68
Setting detail: HOSPITAL OUTPATIENT SURGERY
Discharge: HOME | End: 2018-11-10
Attending: COLON & RECTAL SURGERY | Admitting: COLON & RECTAL SURGERY

## 2018-11-10 ENCOUNTER — ANESTHESIA (OUTPATIENT)
Dept: GASTROENTEROLOGY | Facility: HOSPITAL | Age: 68
End: 2018-11-10

## 2018-11-10 VITALS
HEIGHT: 63 IN | HEART RATE: 57 BPM | BODY MASS INDEX: 32.78 KG/M2 | DIASTOLIC BLOOD PRESSURE: 55 MMHG | SYSTOLIC BLOOD PRESSURE: 116 MMHG | WEIGHT: 185 LBS | RESPIRATION RATE: 16 BRPM | TEMPERATURE: 97.8 F | OXYGEN SATURATION: 96 %

## 2018-11-10 DIAGNOSIS — Z83.71 FAMILY HISTORY OF POLYPS IN THE COLON: ICD-10-CM

## 2018-11-10 DIAGNOSIS — Z86.010 HISTORY OF COLON POLYPS: ICD-10-CM

## 2018-11-10 LAB — GLUCOSE BLDC GLUCOMTR-MCNC: 137 MG/DL (ref 70–130)

## 2018-11-10 PROCEDURE — 25010000002 PROPOFOL 10 MG/ML EMULSION: Performed by: ANESTHESIOLOGY

## 2018-11-10 PROCEDURE — 88305 TISSUE EXAM BY PATHOLOGIST: CPT | Performed by: COLON & RECTAL SURGERY

## 2018-11-10 PROCEDURE — 45380 COLONOSCOPY AND BIOPSY: CPT | Performed by: COLON & RECTAL SURGERY

## 2018-11-10 PROCEDURE — 82962 GLUCOSE BLOOD TEST: CPT

## 2018-11-10 RX ORDER — SODIUM CHLORIDE 0.9 % (FLUSH) 0.9 %
3-10 SYRINGE (ML) INJECTION AS NEEDED
Status: DISCONTINUED | OUTPATIENT
Start: 2018-11-10 | End: 2018-11-10 | Stop reason: HOSPADM

## 2018-11-10 RX ORDER — PROPOFOL 10 MG/ML
VIAL (ML) INTRAVENOUS CONTINUOUS PRN
Status: DISCONTINUED | OUTPATIENT
Start: 2018-11-10 | End: 2018-11-10 | Stop reason: SURG

## 2018-11-10 RX ORDER — SODIUM CHLORIDE 0.9 % (FLUSH) 0.9 %
3 SYRINGE (ML) INJECTION EVERY 12 HOURS SCHEDULED
Status: DISCONTINUED | OUTPATIENT
Start: 2018-11-10 | End: 2018-11-10 | Stop reason: HOSPADM

## 2018-11-10 RX ORDER — PROPOFOL 10 MG/ML
VIAL (ML) INTRAVENOUS AS NEEDED
Status: DISCONTINUED | OUTPATIENT
Start: 2018-11-10 | End: 2018-11-10 | Stop reason: SURG

## 2018-11-10 RX ORDER — LIDOCAINE HYDROCHLORIDE 20 MG/ML
INJECTION, SOLUTION INFILTRATION; PERINEURAL AS NEEDED
Status: DISCONTINUED | OUTPATIENT
Start: 2018-11-10 | End: 2018-11-10 | Stop reason: SURG

## 2018-11-10 RX ORDER — SODIUM CHLORIDE, SODIUM LACTATE, POTASSIUM CHLORIDE, CALCIUM CHLORIDE 600; 310; 30; 20 MG/100ML; MG/100ML; MG/100ML; MG/100ML
30 INJECTION, SOLUTION INTRAVENOUS CONTINUOUS PRN
Status: DISCONTINUED | OUTPATIENT
Start: 2018-11-10 | End: 2018-11-10 | Stop reason: HOSPADM

## 2018-11-10 RX ADMIN — PROPOFOL 100 MG: 10 INJECTION, EMULSION INTRAVENOUS at 08:50

## 2018-11-10 RX ADMIN — LIDOCAINE HYDROCHLORIDE 60 MG: 20 INJECTION, SOLUTION INFILTRATION; PERINEURAL at 08:50

## 2018-11-10 RX ADMIN — PROPOFOL 100 MCG/KG/MIN: 10 INJECTION, EMULSION INTRAVENOUS at 08:50

## 2018-11-10 RX ADMIN — SODIUM CHLORIDE, POTASSIUM CHLORIDE, SODIUM LACTATE AND CALCIUM CHLORIDE 30 ML/HR: 600; 310; 30; 20 INJECTION, SOLUTION INTRAVENOUS at 08:35
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
## 2018-11-10 NOTE — ANESTHESIA POSTPROCEDURE EVALUATION
Patient: Paty Hernandez    Procedure Summary     Date:  11/10/18 Room / Location:  [facility] ENDOSCOPY 5 /  [facility]    Anesthesia Start:  0851 Anesthesia Stop:  0915    Procedure:  COLONOSCOPY TO CECUM WITH POLYPECTOMY (N/A ) Diagnosis:       Family history of polyps in the colon      History of colon polyps      (Family history of polyps in the colon [Z83.71])      (History of colon polyps [Z86.010])    Surgeon:  Cady Gudino MD Provider:  Corrine Hwang MD    Anesthesia Type:  MAC ASA Status:  3          Anesthesia Type: MAC  Last vitals  BP   138/73 (11/10/18 0816)   Temp   36.6 °C (97.8 °F) (11/10/18 0816)   Pulse   66 (11/10/18 0816)   Resp   16 (11/10/18 0816)     SpO2   96 % (11/10/18 0816)     Post Anesthesia Care and Evaluation    Patient location during evaluation: bedside  Patient participation: complete - patient participated  Level of consciousness: awake  Pain management: adequate  Airway patency: patent  Anesthetic complications: No anesthetic complications    Cardiovascular status: acceptable  Respiratory status: acceptable  Hydration status: acceptable

## 2018-11-10 NOTE — ANESTHESIA PREPROCEDURE EVALUATION
Anesthesia Evaluation                  Airway   Mallampati: III  TM distance: >3 FB  Neck ROM: full  No difficulty expected  Dental - normal exam     Pulmonary - normal exam   (+) asthma, sleep apnea,   Cardiovascular - normal exam    (+) hypertension, CAD, hyperlipidemia,       Neuro/Psych  (+) dizziness/light headedness, numbness,     GI/Hepatic/Renal/Endo    (+) obesity, morbid obesity, GERD,  diabetes mellitus type 2,     Musculoskeletal     (+) neck pain,   Abdominal    Substance History      OB/GYN          Other   (+) arthritis                     Anesthesia Plan    ASA 3     MAC     intravenous induction   Anesthetic plan, all risks, benefits, and alternatives have been provided, discussed and informed consent has been obtained with: patient.

## 2018-11-12 LAB
CYTO UR: NORMAL
LAB AP CASE REPORT: NORMAL
PATH REPORT.FINAL DX SPEC: NORMAL
PATH REPORT.GROSS SPEC: NORMAL

## 2018-11-26 RX ORDER — SERTRALINE HYDROCHLORIDE 100 MG/1
TABLET, FILM COATED ORAL
Qty: 180 TABLET | Refills: 3 | Status: SHIPPED | OUTPATIENT
Start: 2018-11-26

## 2018-11-26 RX ORDER — CARVEDILOL 6.25 MG/1
TABLET ORAL
Qty: 90 TABLET | Refills: 3 | Status: SHIPPED | OUTPATIENT
Start: 2018-11-26 | End: 2019-11-20 | Stop reason: SDUPTHER

## 2018-11-26 RX ORDER — FUROSEMIDE 40 MG/1
TABLET ORAL
Qty: 90 TABLET | Refills: 3 | Status: SHIPPED | OUTPATIENT
Start: 2018-11-26 | End: 2019-04-30
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
## 2018-12-12 ENCOUNTER — OFFICE VISIT (OUTPATIENT)
Dept: SURGERY | Facility: CLINIC | Age: 68
End: 2018-12-12

## 2018-12-12 VITALS
OXYGEN SATURATION: 98 % | DIASTOLIC BLOOD PRESSURE: 89 MMHG | HEIGHT: 63 IN | SYSTOLIC BLOOD PRESSURE: 145 MMHG | HEART RATE: 80 BPM | WEIGHT: 186.2 LBS | BODY MASS INDEX: 32.99 KG/M2

## 2018-12-12 DIAGNOSIS — G47.33 OBSTRUCTIVE SLEEP APNEA SYNDROME: ICD-10-CM

## 2018-12-12 DIAGNOSIS — E83.52 HYPERCALCEMIA: ICD-10-CM

## 2018-12-12 DIAGNOSIS — E11.21 TYPE 2 DIABETES MELLITUS WITH DIABETIC NEPHROPATHY, WITHOUT LONG-TERM CURRENT USE OF INSULIN (HCC): ICD-10-CM

## 2018-12-12 DIAGNOSIS — E34.9 INCREASED PTH LEVEL: ICD-10-CM

## 2018-12-12 DIAGNOSIS — S22.080S: ICD-10-CM

## 2018-12-12 DIAGNOSIS — N18.30 STAGE 3 CHRONIC KIDNEY DISEASE (HCC): Primary | ICD-10-CM

## 2018-12-12 PROBLEM — IMO0002 DIABETES MELLITUS TYPE 2, UNCONTROLLED, WITH COMPLICATIONS: Status: ACTIVE | Noted: 2018-08-14

## 2018-12-12 PROCEDURE — 99203 OFFICE O/P NEW LOW 30 MIN: CPT | Performed by: SURGERY

## 2018-12-12 RX ORDER — OLOPATADINE HYDROCHLORIDE 2 MG/ML
SOLUTION/ DROPS OPHTHALMIC DAILY
COMMUNITY
End: 2019-04-30

## 2018-12-12 RX ORDER — HYDROCODONE BITARTRATE AND ACETAMINOPHEN 5; 325 MG/1; MG/1
1 TABLET ORAL EVERY 6 HOURS PRN
COMMUNITY
End: 2019-01-10 | Stop reason: HOSPADM

## 2018-12-20 ENCOUNTER — TREATMENT (OUTPATIENT)
Dept: FAMILY MEDICINE CLINIC | Facility: CLINIC | Age: 68
End: 2018-12-20

## 2018-12-20 RX ORDER — LOSARTAN POTASSIUM 100 MG/1
TABLET ORAL
Qty: 90 TABLET | Refills: 1 | Status: SHIPPED | OUTPATIENT
Start: 2018-12-20 | End: 2019-06-20 | Stop reason: SDUPTHER

## 2018-12-21 ENCOUNTER — OFFICE VISIT (OUTPATIENT)
Dept: FAMILY MEDICINE CLINIC | Facility: CLINIC | Age: 68
End: 2018-12-21

## 2018-12-21 VITALS
BODY MASS INDEX: 32.96 KG/M2 | DIASTOLIC BLOOD PRESSURE: 74 MMHG | SYSTOLIC BLOOD PRESSURE: 128 MMHG | WEIGHT: 186 LBS | HEIGHT: 63 IN | RESPIRATION RATE: 18 BRPM | HEART RATE: 88 BPM

## 2018-12-21 DIAGNOSIS — E11.21 TYPE 2 DIABETES MELLITUS WITH DIABETIC NEPHROPATHY, WITHOUT LONG-TERM CURRENT USE OF INSULIN (HCC): ICD-10-CM

## 2018-12-21 DIAGNOSIS — D35.1 PARATHYROID ADENOMA: ICD-10-CM

## 2018-12-21 DIAGNOSIS — I10 BENIGN ESSENTIAL HYPERTENSION: ICD-10-CM

## 2018-12-21 DIAGNOSIS — IMO0002 DIABETES MELLITUS TYPE 2, UNCONTROLLED, WITH COMPLICATIONS: Primary | ICD-10-CM

## 2018-12-21 DIAGNOSIS — I10 ESSENTIAL HYPERTENSION: ICD-10-CM

## 2018-12-21 DIAGNOSIS — E78.2 MIXED HYPERLIPIDEMIA: ICD-10-CM

## 2018-12-21 PROCEDURE — 99213 OFFICE O/P EST LOW 20 MIN: CPT
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
## 2019-01-07 ENCOUNTER — TELEPHONE (OUTPATIENT)
Dept: SURGERY | Facility: CLINIC | Age: 69
End: 2019-01-07

## 2019-01-07 ENCOUNTER — PREP FOR SURGERY (OUTPATIENT)
Dept: OTHER | Facility: HOSPITAL | Age: 69
End: 2019-01-07

## 2019-01-07 DIAGNOSIS — K80.20 GALLSTONES: Primary | ICD-10-CM

## 2019-01-07 RX ORDER — SODIUM CHLORIDE 0.9 % (FLUSH) 0.9 %
3 SYRINGE (ML) INJECTION EVERY 12 HOURS SCHEDULED
Status: CANCELLED | OUTPATIENT
Start: 2019-01-10

## 2019-01-07 RX ORDER — CEFAZOLIN SODIUM 2 G/100ML
2 INJECTION, SOLUTION INTRAVENOUS ONCE
Status: CANCELLED | OUTPATIENT
Start: 2019-01-10 | End: 2019-01-07

## 2019-01-07 RX ORDER — OXYCODONE HCL 10 MG/1
10 TABLET, FILM COATED, EXTENDED RELEASE ORAL ONCE
Status: CANCELLED | OUTPATIENT
Start: 2019-01-10 | End: 2019-01-07

## 2019-01-07 RX ORDER — SODIUM CHLORIDE 0.9 % (FLUSH) 0.9 %
1-10 SYRINGE (ML) INJECTION AS NEEDED
Status: CANCELLED | OUTPATIENT
Start: 2019-01-10

## 2019-01-07 RX ORDER — CELECOXIB 200 MG/1
200 CAPSULE ORAL ONCE
Status: CANCELLED | OUTPATIENT
Start: 2019-01-10 | End: 2019-01-07

## 2019-01-07 RX ORDER — ACETAMINOPHEN 325 MG/1
650 TABLET ORAL ONCE
Status: CANCELLED | OUTPATIENT
Start: 2019-01-10 | End: 2019-01-07

## 2019-01-10 ENCOUNTER — ANESTHESIA (OUTPATIENT)
Dept: PERIOP | Facility: HOSPITAL | Age: 69
End: 2019-01-10

## 2019-01-10 ENCOUNTER — APPOINTMENT (OUTPATIENT)
Dept: GENERAL RADIOLOGY | Facility: HOSPITAL | Age: 69
End: 2019-01-10

## 2019-01-10 ENCOUNTER — ANESTHESIA EVENT (OUTPATIENT)
Dept: PERIOP | Facility: HOSPITAL | Age: 69
End: 2019-01-10

## 2019-01-10 ENCOUNTER — HOSPITAL ENCOUNTER (OUTPATIENT)
Facility: HOSPITAL | Age: 69
Setting detail: HOSPITAL OUTPATIENT SURGERY
Discharge: HOME | End: 2019-01-10
Attending: SURGERY | Admitting: SURGERY

## 2019-01-10 VITALS
TEMPERATURE: 98 F | OXYGEN SATURATION: 94 % | SYSTOLIC BLOOD PRESSURE: 145 MMHG | RESPIRATION RATE: 16 BRPM | DIASTOLIC BLOOD PRESSURE: 78 MMHG | HEART RATE: 68 BPM

## 2019-01-10 DIAGNOSIS — K80.20 GALLSTONES: ICD-10-CM

## 2019-01-10 LAB
ANION GAP SERPL CALCULATED.3IONS-SCNC: 13.3 MMOL/L
BUN BLD-MCNC: 22 MG/DL (ref 8–23)
BUN/CREAT SERPL: 20.4 (ref 7–25)
CALCIUM SPEC-SCNC: 9.5 MG/DL (ref 8.6–10.5)
CHLORIDE SERPL-SCNC: 109 MMOL/L (ref 98–107)
CO2 SERPL-SCNC: 21.7 MMOL/L (ref 22–29)
CREAT BLD-MCNC: 1.08 MG/DL (ref 0.57–1)
GFR SERPL CREATININE-BSD FRML MDRD: 51 ML/MIN/1.73
GLUCOSE BLD-MCNC: 113 MG/DL (ref 65–99)
GLUCOSE BLDC GLUCOMTR-MCNC: 121 MG/DL (ref 70–130)
HCT VFR BLD AUTO: 32 % (ref 35.6–45.5)
HGB BLD-MCNC: 10.6 G/DL (ref 11.9–15.5)
POTASSIUM BLD-SCNC: 4.1 MMOL/L (ref 3.5–5.2)
SODIUM BLD-SCNC: 144 MMOL/L (ref 136–145)

## 2019-01-10 PROCEDURE — 47563 LAPARO CHOLECYSTECTOMY/GRAPH: CPT | Performed by: SURGERY

## 2019-01-10 PROCEDURE — 74300 X-RAY BILE DUCTS/PANCREAS: CPT

## 2019-01-10 PROCEDURE — 25010000002 ONDANSETRON PER 1 MG: Performed by: NURSE ANESTHETIST, CERTIFIED REGISTERED

## 2019-01-10 PROCEDURE — 25010000002 MIDAZOLAM PER 1 MG: Performed by: ANESTHESIOLOGY

## 2019-01-10 PROCEDURE — 25010000003 CEFAZOLIN IN DEXTROSE 2-4 GM/100ML-% SOLUTION: Performed by: SURGERY

## 2019-01-10 PROCEDURE — 88342 IMHCHEM/IMCYTCHM 1ST ANTB: CPT | Performed by: SURGERY

## 2019-01-10 PROCEDURE — 88305 TISSUE EXAM BY PATHOLOGIST: CPT | Performed by: SURGERY

## 2019-01-10 PROCEDURE — 25010000002 FENTANYL CITRATE (PF) 100 MCG/2ML SOLUTION: Performed by: NURSE ANESTHETIST, CERTIFIED REGISTERED

## 2019-01-10 PROCEDURE — 25010000002 PROPOFOL 10 MG/ML EMULSION: Performed by: NURSE ANESTHETIST, CERTIFIED REGISTERED

## 2019-01-10 PROCEDURE — 85014 HEMATOCRIT: CPT | Performed by: ANESTHESIOLOGY

## 2019-01-10 PROCEDURE — 88304 TISSUE EXAM BY PATHOLOGIST: CPT | Performed by: SURGERY

## 2019-01-10 PROCEDURE — 82962 GLUCOSE BLOOD TEST: CPT

## 2019-01-10 PROCEDURE — 49321 LAPAROSCOPY BIOPSY: CPT | Performed by: PHYSICIAN ASSISTANT

## 2019-01-10 PROCEDURE — 85018 HEMOGLOBIN: CPT | Performed by: ANESTHESIOLOGY

## 2019-01-10 PROCEDURE — 47563 LAPARO CHOLECYSTECTOMY/GRAPH: CPT | Performed by: PHYSICIAN ASSISTANT

## 2019-01-10 PROCEDURE — 0 IOTHALAMATE 60 % SOLUTION: Performed by: SURGERY

## 2019-01-10 PROCEDURE — 25010000002 DEXAMETHASONE PER 1 MG: Performed by: NURSE ANESTHETIST, CERTIFIED REGISTERED

## 2019-01-10 PROCEDURE — 49321 LAPAROSCOPY BIOPSY: CPT | Performed by: SURGERY

## 2019-01-10 PROCEDURE — 80048 BASIC METABOLIC PNL TOTAL CA: CPT | Performed by: ANESTHESIOLOGY

## 2019-01-10 DEVICE — CLIP LIGAT VASC HORIZON TI MD/LG GRN 6CT: Type: IMPLANTABLE DEVICE | Site: ABDOMEN | Status: FUNCTIONAL

## 2019-01-10 RX ORDER — PROMETHAZINE HYDROCHLORIDE 25 MG/1
25 TABLET ORAL ONCE AS NEEDED
Status: DISCONTINUED | OUTPATIENT
Start: 2019-01-10 | End: 2019-01-10 | Stop reason: HOSPADM

## 2019-01-10 RX ORDER — ATROPINE SULFATE 0.4 MG/ML
AMPUL (ML) INJECTION AS NEEDED
Status: DISCONTINUED | OUTPATIENT
Start: 2019-01-10 | End: 2019-01-10 | Stop reason: SURG

## 2019-01-10 RX ORDER — FENTANYL CITRATE 50 UG/ML
INJECTION, SOLUTION INTRAMUSCULAR; INTRAVENOUS AS NEEDED
Status: DISCONTINUED | OUTPATIENT
Start: 2019-01-10 | End: 2019-01-10 | Stop reason: SURG

## 2019-01-10 RX ORDER — FLUMAZENIL 0.1 MG/ML
0.2 INJECTION INTRAVENOUS AS NEEDED
Status: DISCONTINUED | OUTPATIENT
Start: 2019-01-10 | End: 2019-01-10 | Stop reason: HOSPADM

## 2019-01-10 RX ORDER — ROCURONIUM BROMIDE 10 MG/ML
INJECTION, SOLUTION INTRAVENOUS AS NEEDED
Status: DISCONTINUED | OUTPATIENT
Start: 2019-01-10 | End: 2019-01-10 | Stop reason: SURG

## 2019-01-10 RX ORDER — SODIUM CHLORIDE 0.9 % (FLUSH) 0.9 %
1-10 SYRINGE (ML) INJECTION AS NEEDED
Status: DISCONTINUED | OUTPATIENT
Start: 2019-01-10 | End: 2019-01-10 | Stop reason: HOSPADM

## 2019-01-10 RX ORDER — SODIUM CHLORIDE, SODIUM LACTATE, POTASSIUM CHLORIDE, CALCIUM CHLORIDE 600; 310; 30; 20 MG/100ML; MG/100ML; MG/100ML; MG/100ML
9 INJECTION, SOLUTION INTRAVENOUS CONTINUOUS
Status: DISCONTINUED | OUTPATIENT
Start: 2019-01-10 | End: 2019-01-10 | Stop reason: HOSPADM

## 2019-01-10 RX ORDER — PROMETHAZINE HYDROCHLORIDE 25 MG/ML
6.25 INJECTION, SOLUTION INTRAMUSCULAR; INTRAVENOUS ONCE AS NEEDED
Status: CANCELLED | OUTPATIENT
Start: 2019-01-10

## 2019-01-10 RX ORDER — NALBUPHINE HCL 10 MG/ML
2 AMPUL (ML) INJECTION EVERY 4 HOURS PRN
Status: CANCELLED | OUTPATIENT
Start: 2019-01-10

## 2019-01-10 RX ORDER — PROMETHAZINE HYDROCHLORIDE 25 MG/1
25 SUPPOSITORY RECTAL ONCE AS NEEDED
Status: CANCELLED | OUTPATIENT
Start: 2019-01-10

## 2019-01-10 RX ORDER — LIDOCAINE HYDROCHLORIDE 40 MG/ML
SOLUTION TOPICAL AS NEEDED
Status: DISCONTINUED | OUTPATIENT
Start: 2019-01-10 | End: 2019-01-10 | Stop reason: SURG

## 2019-01-10 RX ORDER — NALBUPHINE HCL 10 MG/ML
10 AMPUL (ML) INJECTION EVERY 4 HOURS PRN
Status: CANCELLED | OUTPATIENT
Start: 2019-01-10

## 2019-01-10 RX ORDER — ACETAMINOPHEN 325 MG/1
650 TABLET ORAL ONCE AS NEEDED
Status: CANCELLED | OUTPATIENT
Start: 2019-01-10

## 2019-01-10 RX ORDER — PROMETHAZINE HYDROCHLORIDE 25 MG/1
25 SUPPOSITORY RECTAL ONCE AS NEEDED
Status: DISCONTINUED | OUTPATIENT
Start: 2019-01-10 | End: 2019-01-10 | Stop reason: HOSPADM

## 2019-01-10 RX ORDER — DIPHENHYDRAMINE HYDROCHLORIDE 50 MG/ML
12.5 INJECTION INTRAMUSCULAR; INTRAVENOUS
Status: CANCELLED | OUTPATIENT
Start: 2019-01-10

## 2019-01-10 RX ORDER — HYDROCODONE BITARTRATE AND ACETAMINOPHEN 7.5; 325 MG/1; MG/1
1 TABLET ORAL ONCE AS NEEDED
Status: DISCONTINUED | OUTPATIENT
Start: 2019-01-10 | End: 2019-01-10 | Stop reason: HOSPADM

## 2019-01-10 RX ORDER — MIDAZOLAM HYDROCHLORIDE 1 MG/ML
1 INJECTION INTRAMUSCULAR; INTRAVENOUS
Status: DISCONTINUED | OUTPATIENT
Start: 2019-01-10 | End: 2019-01-10 | Stop reason: HOSPADM

## 2019-01-10 RX ORDER — MIDAZOLAM HYDROCHLORIDE 1 MG/ML
2 INJECTION INTRAMUSCULAR; INTRAVENOUS
Status: DISCONTINUED | OUTPATIENT
Start: 2019-01-10 | End: 2019-01-10 | Stop reason: HOSPADM

## 2019-01-10 RX ORDER — SODIUM CHLORIDE 0.9 % (FLUSH) 0.9 %
3 SYRINGE (ML) INJECTION EVERY 12 HOURS SCHEDULED
Status: DISCONTINUED | OUTPATIENT
Start: 2019-01-10 | End: 2019-01-10 | Stop reason: HOSPADM

## 2019-01-10 RX ORDER — HYDRALAZINE HYDROCHLORIDE 20 MG/ML
5 INJECTION INTRAMUSCULAR; INTRAVENOUS
Status: CANCELLED | OUTPATIENT
Start: 2019-01-10

## 2019-01-10 RX ORDER — NALOXONE HCL 0.4 MG/ML
0.4 VIAL (ML) INJECTION AS NEEDED
Status: CANCELLED | OUTPATIENT
Start: 2019-01-10

## 2019-01-10 RX ORDER — DIPHENHYDRAMINE HCL 25 MG
25 CAPSULE ORAL
Status: DISCONTINUED | OUTPATIENT
Start: 2019-01-10 | End: 2019-01-10 | Stop reason: HOSPADM

## 2019-01-10 RX ORDER — GLYCOPYRROLATE 0.2 MG/ML
INJECTION INTRAMUSCULAR; INTRAVENOUS AS NEEDED
Status: DISCONTINUED | OUTPATIENT
Start: 2019-01-10 | End: 2019-01-10 | Stop reason: SURG

## 2019-01-10 RX ORDER — ONDANSETRON 2 MG/ML
INJECTION INTRAMUSCULAR; INTRAVENOUS AS NEEDED
Status: DISCONTINUED | OUTPATIENT
Start: 2019-01-10 | End: 2019-01-10 | Stop reason: SURG

## 2019-01-10 RX ORDER — DIPHENHYDRAMINE HCL 50 MG
50 CAPSULE ORAL EVERY 6 HOURS PRN
Status: DISCONTINUED | OUTPATIENT
Start: 2019-01-10 | End: 2019-01-10 | Stop reason: HOSPADM

## 2019-01-10 RX ORDER — OXYCODONE AND ACETAMINOPHEN 7.5; 325 MG/1; MG/1
1 TABLET ORAL ONCE AS NEEDED
Status: DISCONTINUED | OUTPATIENT
Start: 2019-01-10 | End: 2019-01-10 | Stop reason: HOSPADM

## 2019-01-10 RX ORDER — EPHEDRINE SULFATE 50 MG/ML
INJECTION, SOLUTION INTRAVENOUS AS NEEDED
Status: DISCONTINUED | OUTPATIENT
Start: 2019-01-10 | End: 2019-01-10 | Stop reason: SURG

## 2019-01-10 RX ORDER — ACETAMINOPHEN 650 MG/1
650 SUPPOSITORY RECTAL ONCE AS NEEDED
Status: CANCELLED | OUTPATIENT
Start: 2019-01-10

## 2019-01-10 RX ORDER — OXYCODONE HCL 10 MG/1
10 TABLET, FILM COATED, EXTENDED RELEASE ORAL ONCE
Status: COMPLETED | OUTPATIENT
Start: 2019-01-10 | End: 2019-01-10

## 2019-01-10 RX ORDER — FENTANYL CITRATE 50 UG/ML
50 INJECTION, SOLUTION INTRAMUSCULAR; INTRAVENOUS
Status: DISCONTINUED | OUTPATIENT
Start: 2019-01-10 | End: 2019-01-10 | Stop reason: HOSPADM

## 2019-01-10 RX ORDER — DEXAMETHASONE SODIUM PHOSPHATE 10 MG/ML
INJECTION INTRAMUSCULAR; INTRAVENOUS AS NEEDED
Status: DISCONTINUED | OUTPATIENT
Start: 2019-01-10 | End: 2019-01-10 | Stop reason: SURG

## 2019-01-10 RX ORDER — ACETAMINOPHEN 500 MG
500 TABLET ORAL ONCE
Status: DISCONTINUED | OUTPATIENT
Start: 2019-01-10 | End: 2019-01-10 | Stop reason: HOSPADM

## 2019-01-10 RX ORDER — ONDANSETRON 2 MG/ML
4 INJECTION INTRAMUSCULAR; INTRAVENOUS ONCE AS NEEDED
Status: COMPLETED | OUTPATIENT
Start: 2019-01-10 | End: 2019-01-10

## 2019-01-10 RX ORDER — PROMETHAZINE HYDROCHLORIDE 25 MG/ML
12.5 INJECTION, SOLUTION INTRAMUSCULAR; INTRAVENOUS ONCE AS NEEDED
Status: DISCONTINUED | OUTPATIENT
Start: 2019-01-10 | End: 2019-01-10 | Stop reason: HOSPADM

## 2019-01-10 RX ORDER — PROPOFOL 10 MG/ML
VIAL (ML) INTRAVENOUS AS NEEDED
Status: DISCONTINUED | OUTPATIENT
Start: 2019-01-10 | End: 2019-01-10 | Stop reason: SURG

## 2019-01-10 RX ORDER — CELECOXIB 200 MG/1
200 CAPSULE ORAL ONCE
Status: COMPLETED | OUTPATIENT
Start: 2019-01-10 | End: 2019-01-10

## 2019-01-10 RX ORDER — HYDROCODONE BITARTRATE AND ACETAMINOPHEN 5; 325 MG/1; MG/1
1 TABLET ORAL ONCE AS NEEDED
Status: CANCELLED | OUTPATIENT
Start: 2019-01-10

## 2019-01-10 RX ORDER — LIDOCAINE HYDROCHLORIDE 10 MG/ML
0.5 INJECTION, SOLUTION EPIDURAL; INFILTRATION; INTRACAUDAL; PERINEURAL ONCE AS NEEDED
Status: DISCONTINUED | OUTPATIENT
Start: 2019-01-10 | End: 2019-01-10 | Stop reason: HOSPADM

## 2019-01-10 RX ORDER — ACETAMINOPHEN 650 MG/1
650 SUPPOSITORY RECTAL ONCE AS NEEDED
Status: DISCONTINUED | OUTPATIENT
Start: 2019-01-10 | End: 2019-01-10 | Stop reason: HOSPADM

## 2019-01-10 RX ORDER — HYDROCODONE BITARTRATE AND ACETAMINOPHEN 5; 325 MG/1; MG/1
1 TABLET ORAL EVERY 4 HOURS PRN
Qty: 20 TABLET | Refills: 0 | Status: SHIPPED | OUTPATIENT
Start: 2019-01-10 | End: 2019-01-28

## 2019-01-10 RX ORDER — ACETAMINOPHEN 325 MG/1
650 TABLET ORAL ONCE AS NEEDED
Status: DISCONTINUED | OUTPATIENT
Start: 2019-01-10 | End: 2019-01-10 | Stop reason: HOSPADM

## 2019-01-10 RX ORDER — HYDROMORPHONE HYDROCHLORIDE 1 MG/ML
0.25 INJECTION, SOLUTION INTRAMUSCULAR; INTRAVENOUS; SUBCUTANEOUS
Status: CANCELLED | OUTPATIENT
Start: 2019-01-10 | End: 2019-01-11

## 2019-01-10 RX ORDER — HYDROMORPHONE HYDROCHLORIDE 1 MG/ML
0.5 INJECTION, SOLUTION INTRAMUSCULAR; INTRAVENOUS; SUBCUTANEOUS
Status: DISCONTINUED | OUTPATIENT
Start: 2019-01-10 | End: 2019-01-10 | Stop reason: HOSPADM

## 2019-01-10 RX ORDER — ACETAMINOPHEN 325 MG/1
650 TABLET ORAL ONCE
Status: COMPLETED | OUTPATIENT
Start: 2019-01-10 | End: 2019-01-10

## 2019-01-10 RX ORDER — NALOXONE HCL 0.4 MG/ML
0.2 VIAL (ML) INJECTION AS NEEDED
Status: DISCONTINUED | OUTPATIENT
Start: 2019-01-10 | End: 2019-01-10 | Stop reason: HOSPADM

## 2019-01-10 RX ORDER — CEFAZOLIN SODIUM 2 G/100ML
2 INJECTION, SOLUTION INTRAVENOUS ONCE
Status: COMPLETED | OUTPATIENT
Start: 2019-01-10 | End: 2019-01-10

## 2019-01-10 RX ORDER — BUPIVACAINE HYDROCHLORIDE AND EPINEPHRINE 5; 5 MG/ML; UG/ML
INJECTION, SOLUTION PERINEURAL AS NEEDED
Status: DISCONTINUED | OUTPATIENT
Start: 2019-01-10 | End: 2019-01-10 | Stop reason: HOSPADM

## 2019-01-10 RX ORDER — LABETALOL HYDROCHLORIDE 5 MG/ML
5 INJECTION, SOLUTION INTRAVENOUS
Status: DISCONTINUED | OUTPATIENT
Start: 2019-01-10 | End: 2019-01-10 | Stop reason: HOSPADM

## 2019-01-10 RX ORDER — LIDOCAINE HYDROCHLORIDE 40 MG/ML
SOLUTION TOPICAL
Status: COMPLETED
Start: 2019-01-10 | End: 2019-01-10

## 2019-01-10 RX ORDER — MAGNESIUM HYDROXIDE 1200 MG/15ML
LIQUID ORAL AS NEEDED
Status: DISCONTINUED | OUTPATIENT
Start: 2019-01-10 | End: 2019-01-10 | Stop reason: HOSPADM

## 2019-01-10 RX ORDER — FENTANYL CITRATE 50 UG/ML
25 INJECTION, SOLUTION INTRAMUSCULAR; INTRAVENOUS
Status: CANCELLED | OUTPATIENT
Start: 2019-01-10

## 2019-01-10 RX ORDER — SODIUM CHLORIDE 9 MG/ML
INJECTION, SOLUTION INTRAVENOUS AS NEEDED
Status: DISCONTINUED | OUTPATIENT
Start: 2019-01-10 | End: 2019-01-10 | Stop reason: HOSPADM

## 2019-01-10 RX ORDER — PROMETHAZINE HYDROCHLORIDE 25 MG/1
25 TABLET ORAL ONCE AS NEEDED
Status: CANCELLED | OUTPATIENT
Start: 2019-01-10

## 2019-01-10 RX ORDER — ONDANSETRON 4 MG/1
4 TABLET, FILM COATED ORAL EVERY 8 HOURS PRN
Qty: 20 TABLET | Refills: 0 | Status: SHIPPED | OUTPATIENT
Start: 2019-01-10 | End: 2019-01-28

## 2019-01-10 RX ORDER — EPHEDRINE SULFATE 50 MG/ML
5 INJECTION, SOLUTION INTRAVENOUS ONCE AS NEEDED
Status: DISCONTINUED | OUTPATIENT
Start: 2019-01-10 | End: 2019-01-10 | Stop reason: HOSPADM

## 2019-01-10 RX ORDER — HYDRALAZINE HYDROCHLORIDE 20 MG/ML
5 INJECTION INTRAMUSCULAR; INTRAVENOUS
Status: DISCONTINUED | OUTPATIENT
Start: 2019-01-10 | End: 2019-01-10 | Stop reason: HOSPADM

## 2019-01-10 RX ADMIN — ACETAMINOPHEN 650 MG: 325 TABLET, FILM COATED ORAL at 07:46

## 2019-01-10 RX ADMIN — CEFAZOLIN SODIUM 2 G: 2 INJECTION, SOLUTION INTRAVENOUS at 08:51

## 2019-01-10 RX ADMIN — EPHEDRINE SULFATE 5 MG: 50 INJECTION INTRAMUSCULAR; INTRAVENOUS; SUBCUTANEOUS at 09:05

## 2019-01-10 RX ADMIN — FENTANYL CITRATE 25 MCG: 50 INJECTION INTRAMUSCULAR; INTRAVENOUS at 09:37

## 2019-01-10 RX ADMIN — SODIUM CHLORIDE, POTASSIUM CHLORIDE, SODIUM LACTATE AND CALCIUM CHLORIDE: 600; 310; 30; 20 INJECTION, SOLUTION INTRAVENOUS at 08:42

## 2019-01-10 RX ADMIN — LIDOCAINE HYDROCHLORIDE 1 EACH: 40 SOLUTION TOPICAL at 08:50

## 2019-01-10 RX ADMIN — SUGAMMADEX 200 MG: 100 INJECTION, SOLUTION INTRAVENOUS at 09:38

## 2019-01-10 RX ADMIN — CELECOXIB 200 MG: 200 CAPSULE ORAL at 07:46

## 2019-01-10 RX ADMIN — FENTANYL CITRATE 50 MCG: 50 INJECTION INTRAMUSCULAR; INTRAVENOUS at 08:46

## 2019-01-10 RX ADMIN — ONDANSETRON 4 MG: 2 INJECTION INTRAMUSCULAR; INTRAVENOUS at 10:39

## 2019-01-10 RX ADMIN — ATROPINE SULFATE 0.2 MG: 0.4 INJECTION, SOLUTION INTRAMUSCULAR; INTRAVENOUS; SUBCUTANEOUS at 09:03

## 2019-01-10 RX ADMIN — MIDAZOLAM HYDROCHLORIDE 2 MG: 2 INJECTION, SOLUTION INTRAMUSCULAR; INTRAVENOUS at 07:46

## 2019-01-10 RX ADMIN — GLYCOPYRROLATE 0.2 MG: 0.2 INJECTION INTRAMUSCULAR; INTRAVENOUS at 08:54

## 2019-01-10 RX ADMIN — DEXAMETHASONE SODIUM PHOSPHATE 8 MG: 10 INJECTION INTRAMUSCULAR; INTRAVENOUS at 08:54

## 2019-01-10 RX ADMIN — ROCURONIUM BROMIDE 50 MG: 10 INJECTION, SOLUTION INTRAVENOUS at 08:48

## 2019-01-10 RX ADMIN — OXYCODONE HYDROCHLORIDE 10 MG: 10 TABLET, FILM COATED, EXTENDED RELEASE ORAL at 07:46

## 2019-01-10 RX ADMIN — PROPOFOL 200 MG: 10 INJECTION, EMULSION INTRAVENOUS at 08:48

## 2019-01-10 RX ADMIN — SODIUM CHLORIDE, POTASSIUM CHLORIDE, SODIUM LACTATE AND CALCIUM CHLORIDE 9 ML/HR: 600; 310; 30; 20 INJECTION, SOLUTION INTRAVENOUS at 10:09

## 2019-01-10 RX ADMIN — FENTANYL CITRATE 50 MCG: 50 INJECTION, SOLUTION INTRAMUSCULAR; INTRAVENOUS at 10:02

## 2019-01-10 RX ADMIN — FENTANYL CITRATE 50 MCG: 50 INJECTION, SOLUTION INTRAMUSCULAR; INTRAVENOUS at 11:07

## 2019-01-10 RX ADMIN — ONDANSETRON 4 MG: 2 INJECTION INTRAMUSCULAR; INTRAVENOUS at 09:33

## 2019-01-10 NOTE — OP NOTE
"Laparoscopic Cholecystectomy :  Joi Hernandez  1950    Procedure Date: 01/10/19    Pre-op Diagnosis:   · Gallstones  · Right lower quadrant pain    Post-op Diagnosis:   · Same  · Adhesions  · Discoloration peritoneum, likely transmucosal lisa ink from colon marking    Procedure: laparoscopic cholecystectomy with cholangiogram, adhesiolysis, peritoneal biopsy    Surgeon: joi    Assistant: irish    Indications: nice lady who comes in with diagnosis of gallstones, initially indicating no symptoms, but later in the week, with return call stating right sided pain.  Today she says it is more lower quadrant, and she has a history of an appendix that is \"close to the gallbladder\" and requests we evaluate.    Associated Issues:  · Right lower quadrant pain  · Hyperparathyroidism suspected, with SPECT CT scan suggesting 5 mm left inferior cervical parathyroid adenoma, possible 6.5 cm right inferior occult parathyroid adenoma.  · Mild splenomegaly by CT  · Hypertension on Lasix, Coreg, losartan  · Diabetes on Januvia  · Asthma on Advair when necessary  · Anxiety/depression on sertraline  · Hyperlipidemia with recommendation for initiation of a statin, though she has not done so yet.    Findings:   · Appendix paracolonic, tracking upward towards gallbladder without any obvious pathology, and not removed.  · Adhesions of the ascending epiploicae to the omentum, suggesting possible internal hernia and source of right lower quadrant pain and lysed.  · Adhesions of omentum to anterior abdominal wall and pelvis, not felt symptomatic and left in place.  · Discoloration of the peritoneum, biopsied.  Later, inking on ascending colon ID'd and felt likely source, rather than pathology.  Sent albeit, for patient's reassurance    Recommendations:   · Routine recuperation.    Technique:     General anesthetic was given.  IV antibiotics were given (kefzol).  The abdomen was prepped with Hibiclens and draped sterilely.  A " small incision was made with 11 blade above the umbilicus, CO2 insufflated via the Veress needle, followed by a 5 mm trocar, and a 5 mm laparoscope.  Three additional trocars were placed under direct vision, being an 11 mm bladeless in the subxyphoid and two 5 mm nondisposables in the right upper quadrant.    The gallbladder had few adhesions which were taken down with blunt traction.  The cystic duct was dissected out.  It was clipped proximally, opened with the scissors to allow placement of the cholangiocath, which was placed and cholangiogram performed with findings as noted.  The catheter was then removed and 2 clips were placed on the duct distally and it was divided.  The artery was dissected as two branches.  It was clipped twice proximally, once distally and divided.  The gallbladder was dissected out using the cautery on 20.  The plain between the gallbladder wall and the liver fossa was fairly straightforward  The gallbladder was then brought out through the subxiphoid site, doing so in an endocatch bag.   Irrigation and suction were not necessary, with virtually no bleeding or spillage.    We then placed the patient head down and evaluated the right lower quadrant.  There was no hernia.  The adhesions were taken down with cautery on the hook.  The peritoneal biopsy was done with the alligator biopsy forceps.      The subxiphoid site was then closed at the fascial level with 0 Vicryl suture and the skin at all sites with 5-0 Vicryl.  Skin affix was applied.    Mary Ellen Tamez MD  01/10/19  9:51 AM

## 2019-01-10 NOTE — ANESTHESIA POSTPROCEDURE EVALUATION
Patient: Paty Hernandez    Procedure Summary     Date:  01/10/19 Room / Location:   [facility]    Anesthesia Start:  0842 Anesthesia Stop:  0947    Procedure:  laparoscopic cholecystectomy with cholangiogram, LYSIS OF ADHESIONS, PERITONEAL BIPOSY (N/A Abdomen) Diagnosis:       Gallstones      (Gallstones [K80.20])    Surgeon:  Mary Ellen Tamez MD Provider:  Bassem Retana MD    Anesthesia Type:  general ASA Status:  3          Anesthesia Type: general  Last vitals  BP   134/79 (01/10/19 1135)   Temp   36.7 °C (98 °F) (01/10/19 1135)   Pulse   68 (01/10/19 1135)   Resp   16 (01/10/19 1135)     SpO2   94 % (01/10/19 1135)     Post Anesthesia Care and Evaluation    Patient location during evaluation: bedside  Patient participation: complete - patient participated  Level of consciousness: awake and alert  Pain management: adequate  Airway patency: patent  Anesthetic complications: No anesthetic complications    Cardiovascular status: acceptable  Respiratory status: acceptable  Hydration status: acceptable    Comments: /79   Pulse 68   Temp 36.7 °C (98 °F) (Temporal)   Resp 16   LMP  (LMP Unknown)   SpO2 94%

## 2019-01-10 NOTE — ANESTHESIA PREPROCEDURE EVALUATION
Anesthesia Evaluation     NPO Solid Status: > 8 hours             Airway   Mallampati: III  TM distance: >3 FB  Neck ROM: full  No difficulty expected  Dental - normal exam     Pulmonary - normal exam   (+) asthma, sleep apnea,   Cardiovascular   Exercise tolerance: excellent (>7 METS)    ECG reviewed  Rhythm: regular    (+) hypertension, CAD, cardiac stents more than 12 months ago hyperlipidemia,   (-) murmur, carotid bruits    ROS comment: Normal lexiscan    Neuro/Psych  (+) headaches, dizziness/light headedness, numbness, psychiatric history,     GI/Hepatic/Renal/Endo    (+) obesity, morbid obesity, GERD,  diabetes mellitus,     Musculoskeletal     (+) neck pain,   Abdominal    Substance History      OB/GYN          Other                        Anesthesia Plan    ASA 3     general     intravenous induction

## 2019-01-10 NOTE — ANESTHESIA PROCEDURE NOTES
ANESTHESIA INTUBATION  Urgency: elective    Airway not difficult    General Information and Staff    Patient location during procedure: OR  Anesthesiologist: Bassem Retana MD  CRNA: Jeanette Painter CRNA    Indications and Patient Condition  Indications for airway management: airway protection    Preoxygenated: yes  Mask difficulty assessment: 2 - vent by mask + OA or adjuvant +/- NMBA    Final Airway Details  Final airway type: endotracheal airway      Successful airway: ETT  Cuffed: yes   Successful intubation technique: direct laryngoscopy  Endotracheal tube insertion site: oral  Blade: Drake  Blade size: 3  ETT size (mm): 7.0  Cormack-Lehane Classification: grade IIb - view of arytenoids or posterior of glottis only  Placement verified by: chest auscultation and capnometry   Cuff volume (mL): 6  Measured from: teeth  ETT to teeth (cm): 21  Number of attempts at approach: 1

## 2019-01-14 LAB
CYTO UR: NORMAL
LAB AP CASE REPORT: NORMAL
LAB AP DIAGNOSIS COMMENT: NORMAL
LAB AP SPECIAL STAINS: NORMAL
PATH REPORT.FINAL DX SPEC: NORMAL
PATH REPORT.GROSS SPEC: NORMAL

## 2019-01-17 ENCOUNTER — TELEPHONE (OUTPATIENT)
Dept: SURGERY | Facility: CLINIC | Age: 69
End: 2019-01-17

## 2019-01-17 NOTE — TELEPHONE ENCOUNTER
Patient had left a message on the clinical voice mail regarding how to care for her incisions. I left a message on the patients mobile number stating that she showers normally, lets the soap & water run over the sites (does not need to scrub those areas) and once she is done she should pat dry the areas. The skin affix glue needs to remain in place for 2 weeks and will begin to flake off gradually. After 2 weeks she may apply Vaseline which will soften the glue then she may remove it. Instructed patient to return my call should she have any additional questions.

## 2019-01-28 ENCOUNTER — OFFICE VISIT (OUTPATIENT)
Dept: SURGERY | Facility: CLINIC | Age: 69
End: 2019-01-28

## 2019-01-28 DIAGNOSIS — K80.20 GALLSTONES: Primary | ICD-10-CM

## 2019-01-28 PROCEDURE — 99024 POSTOP FOLLOW-UP VISIT: CPT | Performed by: SURGERY

## 2019-01-28 RX ORDER — RANITIDINE 150 MG/1
CAPSULE ORAL
Qty: 60 CAPSULE | Refills: 10 | Status: SHIPPED | OUTPATIENT
Start: 2019-01-28

## 2019-01-28 RX ORDER — ALPRAZOLAM 1 MG/1
TABLET ORAL
Qty: 90 TABLET | Refills: 4 | Status: CANCELLED | OUTPATIENT
Start: 2019-01-28

## 2019-01-28 RX ORDER — NITROFURANTOIN 25; 75 MG/1; MG/1
CAPSULE ORAL
COMMUNITY
Start: 2019-01-23 | End: 2019-04-30

## 2019-01-28 NOTE — PROGRESS NOTES
SURGERY: PATRIC[facility]  Post op Visit  Paty Hernandez  01/28/19    Ms. Hernandez presents today after having undergone Surgery 1/10/19, of a laparoscopic cholecystectomy with xray, adhesiolysis and peritoneal biopsy.  Pathology consistent with ink, likely related to polyps that have been inked.  I explained that her appendix did course up towards the liver.  It appeared normal, so we didn't take it out.    Today, she states that she fell about 10 days after her surgery and had quite a bit of bruising.  Happily, this did not affect her incisions.  She will see Dr Wu for her neck surgery.    Mary Ellen Tamez MD

## 2019-01-29 RX ORDER — ALPRAZOLAM 1 MG/1
1 TABLET ORAL 3 TIMES DAILY PRN
Qty: 90 TABLET | Refills: 3 | Status: SHIPPED | OUTPATIENT
Start: 2019-01-29

## 2019-04-02 RX ORDER — ROSUVASTATIN CALCIUM 10 MG/1
TABLET, COATED ORAL
Qty: 30 TABLET | Refills: 2 | Status: SHIPPED | OUTPATIENT
Start: 2019-04-02 | End: 2020-05-06 | Stop reason: SDUPTHER

## 2019-04-25 RX ORDER — ROSUVASTATIN CALCIUM 10 MG/1
TABLET, COATED ORAL
Qty: 30 TABLET | Refills: 2 | Status: SHIPPED | OUTPATIENT
Start: 2019-04-25 | End: 2019-04-30

## 2019-04-30 ENCOUNTER — TREATMENT (OUTPATIENT)
Dept: FAMILY MEDICINE CLINIC | Facility: CLINIC | Age: 69
End: 2019-04-30

## 2019-04-30 ENCOUNTER — OFFICE VISIT (OUTPATIENT)
Dept: FAMILY MEDICINE CLINIC | Facility: CLINIC | Age: 69
End: 2019-04-30

## 2019-04-30 VITALS
DIASTOLIC BLOOD PRESSURE: 68 MMHG | RESPIRATION RATE: 18 BRPM | SYSTOLIC BLOOD PRESSURE: 102 MMHG | OXYGEN SATURATION: 96 % | BODY MASS INDEX: 34.02 KG/M2 | WEIGHT: 192 LBS | HEIGHT: 63 IN | HEART RATE: 68 BPM

## 2019-04-30 DIAGNOSIS — R14.0 ABDOMINAL BLOATING: ICD-10-CM

## 2019-04-30 DIAGNOSIS — N18.30 STAGE 3 CHRONIC KIDNEY DISEASE (HCC): ICD-10-CM

## 2019-04-30 DIAGNOSIS — R19.5 LOOSE BOWEL MOVEMENTS: ICD-10-CM

## 2019-04-30 DIAGNOSIS — R53.82 CHRONIC FATIGUE: Primary | ICD-10-CM

## 2019-04-30 PROBLEM — D35.1 PARATHYROID ADENOMA: Status: RESOLVED | Noted: 2018-12-21 | Resolved: 2019-04-30

## 2019-04-30 PROBLEM — E34.9 INCREASED PTH LEVEL: Status: RESOLVED | Noted: 2018-09-24 | Resolved: 2019-04-30

## 2019-04-30 PROBLEM — E83.52 HYPERCALCEMIA: Status: RESOLVED | Noted: 2018-08-14 | Resolved: 2019-04-30

## 2019-04-30 PROCEDURE — 99213 OFFICE O/P EST LOW 20 MIN: CPT

## 2019-04-30 RX ORDER — HYDROCODONE BITARTRATE AND ACETAMINOPHEN 5; 325 MG/1; MG/1
1 TABLET ORAL EVERY 6 HOURS PRN
COMMUNITY

## 2019-04-30 RX ORDER — ONDANSETRON 4 MG/1
4 TABLET, FILM COATED ORAL AS NEEDED
COMMUNITY

## 2019-04-30 RX ORDER — FUROSEMIDE 20 MG/1
20 TABLET ORAL DAILY
COMMUNITY

## 2019-04-30 RX ORDER — MONTELUKAST SODIUM 10 MG/1
10 TABLET ORAL NIGHTLY
Status: ON HOLD | COMMUNITY
End: 2019-07-05 | Stop reason: SDUPTHER
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
## 2019-05-14 ENCOUNTER — TELEPHONE (OUTPATIENT)
Dept: SURGERY | Facility: CLINIC | Age: 69
End: 2019-05-14

## 2019-06-13 DIAGNOSIS — E78.2 MIXED HYPERLIPIDEMIA: ICD-10-CM

## 2019-06-13 DIAGNOSIS — Z11.59 NEED FOR HEPATITIS C SCREENING TEST: Primary | ICD-10-CM

## 2019-06-13 DIAGNOSIS — E11.21 TYPE 2 DIABETES MELLITUS WITH DIABETIC NEPHROPATHY, WITHOUT LONG-TERM CURRENT USE OF INSULIN (HCC): ICD-10-CM

## 2019-06-20 RX ORDER — LOSARTAN POTASSIUM 100 MG/1
TABLET ORAL
Qty: 90 TABLET | Refills: 0 | Status: SHIPPED | OUTPATIENT
Start: 2019-06-20 | End: 2019-09-20 | Stop reason: SDUPTHER

## 2019-06-21 ENCOUNTER — APPOINTMENT (OUTPATIENT)
Dept: LAB | Facility: HOSPITAL | Age: 69
End: 2019-06-21

## 2019-06-21 LAB
ALBUMIN SERPL-MCNC: 4.6 G/DL (ref 3.5–5.2)
ALBUMIN/GLOB SERPL: 1.8 G/DL
ALP SERPL-CCNC: 62 U/L (ref 39–117)
ALT SERPL W P-5'-P-CCNC: 38 U/L (ref 1–33)
ANION GAP SERPL CALCULATED.3IONS-SCNC: 16.6 MMOL/L
AST SERPL-CCNC: 44 U/L (ref 1–32)
BILIRUB SERPL-MCNC: 0.6 MG/DL (ref 0.2–1.2)
BUN BLD-MCNC: 20 MG/DL (ref 8–23)
BUN/CREAT SERPL: 16.4 (ref 7–25)
CALCIUM SPEC-SCNC: 9.3 MG/DL (ref 8.6–10.5)
CHLORIDE SERPL-SCNC: 100 MMOL/L (ref 98–107)
CHOLEST SERPL-MCNC: 248 MG/DL (ref 0–200)
CO2 SERPL-SCNC: 23.4 MMOL/L (ref 22–29)
CREAT BLD-MCNC: 1.22 MG/DL (ref 0.57–1)
GFR SERPL CREATININE-BSD FRML MDRD: 44 ML/MIN/1.73
GLOBULIN UR ELPH-MCNC: 2.5 GM/DL
GLUCOSE BLD-MCNC: 194 MG/DL (ref 65–99)
HBA1C MFR BLD: 7.07 % (ref 4.8–5.6)
HDLC SERPL-MCNC: 52 MG/DL (ref 40–60)
LDLC SERPL CALC-MCNC: 129 MG/DL (ref 0–100)
LDLC/HDLC SERPL: 2.49 {RATIO}
POTASSIUM BLD-SCNC: 4 MMOL/L (ref 3.5–5.2)
PROT SERPL-MCNC: 7.1 G/DL (ref 6–8.5)
SODIUM BLD-SCNC: 140 MMOL/L (ref 136–145)
TRIGL SERPL-MCNC: 333 MG/DL (ref 0–150)
VLDLC SERPL-MCNC: 66.6 MG/DL (ref 5–40)

## 2019-06-21 PROCEDURE — 80053 COMPREHEN METABOLIC PANEL: CPT

## 2019-06-21 PROCEDURE — 86803 HEPATITIS C AB TEST: CPT

## 2019-06-21 PROCEDURE — 36415 COLL VENOUS BLD VENIPUNCTURE: CPT

## 2019-06-21 PROCEDURE — 80061 LIPID PANEL: CPT

## 2019-06-21 PROCEDURE — 83036 HEMOGLOBIN GLYCOSYLATED A1C: CPT

## 2019-06-21 RX ORDER — MONTELUKAST SODIUM 10 MG/1
TABLET ORAL
Qty: 90 TABLET | Refills: 2 | Status: SHIPPED | OUTPATIENT
Start: 2019-06-21 | End: 2019-10-28 | Stop reason: SDUPTHER

## 2019-06-22 LAB
HCV AB S/CO SERPL IA: <0.1 S/CO RATIO (ref 0–0.9)
INTERPRETATION: NORMAL

## 2019-06-25 ENCOUNTER — OFFICE VISIT (OUTPATIENT)
Dept: GASTROENTEROLOGY | Facility: CLINIC | Age: 69
End: 2019-06-25

## 2019-06-25 VITALS
BODY MASS INDEX: 34.89 KG/M2 | TEMPERATURE: 98.3 F | HEIGHT: 62 IN | SYSTOLIC BLOOD PRESSURE: 126 MMHG | WEIGHT: 189.6 LBS | DIASTOLIC BLOOD PRESSURE: 74 MMHG

## 2019-06-25 DIAGNOSIS — K21.9 GASTROESOPHAGEAL REFLUX DISEASE, ESOPHAGITIS PRESENCE NOT SPECIFIED: Primary | ICD-10-CM

## 2019-06-25 DIAGNOSIS — R63.5 WEIGHT GAIN, ABNORMAL: ICD-10-CM

## 2019-06-25 DIAGNOSIS — K52.9 CHRONIC DIARRHEA: ICD-10-CM

## 2019-06-25 PROCEDURE — 99214 OFFICE O/P EST MOD 30 MIN: CPT | Performed by: INTERNAL MEDICINE

## 2019-06-25 RX ORDER — CEPHALEXIN 250 MG/1
CAPSULE ORAL
COMMUNITY
Start: 2019-06-16
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
## 2019-07-02 ENCOUNTER — TELEPHONE (OUTPATIENT)
Dept: GASTROENTEROLOGY | Facility: CLINIC | Age: 69
End: 2019-07-02

## 2019-07-02 NOTE — TELEPHONE ENCOUNTER
----- Message from Tatum Diaz sent at 7/2/2019 10:49 AM EDT -----  Regarding: heartburn   Contact: [phone]  Pt complain of heartburn really bad and scope on fri

## 2019-07-02 NOTE — TELEPHONE ENCOUNTER
"Per office note from 6/25/19 with Dr Corrales:   \"This point with the heartburn we will change her PPI and give her samples of Dexilant pending results and arrange EGD for both evaluation of reflux as well as duodenal biopsy to rule out celiac.  We will follow-up clinically based on findings.\"      Called pt back. Pt states she feels like she has a puddle or pool of acid at the top of her throat and she is very uncomfortable. She started taking the Dexilant on Wednesday after her appt with Dr Corrales but it does not seem to be helping. She took one of those today. She has also taken taken Tums and drank Pepto bismol. She had been on ranitidine, but she is no longer taking that. Pt denies chest pain, just feels like there is lots of acid in her esophagus. Advised ill update MD and call back with recs. Pt verb understanding.    "

## 2019-07-02 NOTE — TELEPHONE ENCOUNTER
I recommend she take Dexilant 60 mg once in the morning and ranitidine in the evening.  Avoid fatty foods, alcohol, desserts made with oil, citrus fruits, and mints.  Avoid overeating.  Avoid eating at least 3 hours to bedtime.

## 2019-07-03 RX ORDER — DEXLANSOPRAZOLE 60 MG/1
60 CAPSULE, DELAYED RELEASE ORAL
Qty: 90 CAPSULE | Refills: 3 | Status: SHIPPED | OUTPATIENT
Start: 2019-07-03

## 2019-07-03 NOTE — TELEPHONE ENCOUNTER
Called pt and advised of the note from [place]. Advised that will call the Dexilant to her pharmacy for her. Pt verb understanding.

## 2019-07-05 ENCOUNTER — HOSPITAL ENCOUNTER (OUTPATIENT)
Facility: HOSPITAL | Age: 69
Setting detail: HOSPITAL OUTPATIENT SURGERY
Discharge: HOME | End: 2019-07-05
Attending: INTERNAL MEDICINE | Admitting: INTERNAL MEDICINE

## 2019-07-05 ENCOUNTER — ANESTHESIA EVENT (OUTPATIENT)
Dept: GASTROENTEROLOGY | Facility: HOSPITAL | Age: 69
End: 2019-07-05

## 2019-07-05 ENCOUNTER — ANESTHESIA (OUTPATIENT)
Dept: GASTROENTEROLOGY | Facility: HOSPITAL | Age: 69
End: 2019-07-05

## 2019-07-05 VITALS
HEART RATE: 66 BPM | DIASTOLIC BLOOD PRESSURE: 69 MMHG | TEMPERATURE: 98.1 F | BODY MASS INDEX: 35.35 KG/M2 | OXYGEN SATURATION: 99 % | SYSTOLIC BLOOD PRESSURE: 138 MMHG | RESPIRATION RATE: 16 BRPM | WEIGHT: 192.1 LBS | HEIGHT: 62 IN

## 2019-07-05 DIAGNOSIS — K52.9 CHRONIC DIARRHEA: ICD-10-CM

## 2019-07-05 DIAGNOSIS — R63.5 WEIGHT GAIN, ABNORMAL: ICD-10-CM

## 2019-07-05 DIAGNOSIS — K21.9 GASTROESOPHAGEAL REFLUX DISEASE, ESOPHAGITIS PRESENCE NOT SPECIFIED: ICD-10-CM

## 2019-07-05 LAB — GLUCOSE BLDC GLUCOMTR-MCNC: 151 MG/DL (ref 70–130)

## 2019-07-05 PROCEDURE — 88305 TISSUE EXAM BY PATHOLOGIST: CPT | Performed by: INTERNAL MEDICINE

## 2019-07-05 PROCEDURE — 43239 EGD BIOPSY SINGLE/MULTIPLE: CPT | Performed by: INTERNAL MEDICINE

## 2019-07-05 PROCEDURE — 25010000002 PROPOFOL 10 MG/ML EMULSION: Performed by: ANESTHESIOLOGY

## 2019-07-05 PROCEDURE — 82962 GLUCOSE BLOOD TEST: CPT

## 2019-07-05 RX ORDER — SODIUM CHLORIDE 0.9 % (FLUSH) 0.9 %
3 SYRINGE (ML) INJECTION EVERY 12 HOURS SCHEDULED
Status: DISCONTINUED | OUTPATIENT
Start: 2019-07-05 | End: 2019-07-05 | Stop reason: HOSPADM

## 2019-07-05 RX ORDER — LIDOCAINE HYDROCHLORIDE 20 MG/ML
INJECTION, SOLUTION INFILTRATION; PERINEURAL AS NEEDED
Status: DISCONTINUED | OUTPATIENT
Start: 2019-07-05 | End: 2019-07-05 | Stop reason: SURG

## 2019-07-05 RX ORDER — COLESEVELAM 180 1/1
625 TABLET ORAL 2 TIMES DAILY WITH MEALS
Qty: 180 TABLET | Refills: 3 | Status: SHIPPED | OUTPATIENT
Start: 2019-07-05 | End: 2020-02-25

## 2019-07-05 RX ORDER — SODIUM CHLORIDE, SODIUM LACTATE, POTASSIUM CHLORIDE, CALCIUM CHLORIDE 600; 310; 30; 20 MG/100ML; MG/100ML; MG/100ML; MG/100ML
30 INJECTION, SOLUTION INTRAVENOUS CONTINUOUS PRN
Status: DISCONTINUED | OUTPATIENT
Start: 2019-07-05 | End: 2019-07-05 | Stop reason: HOSPADM

## 2019-07-05 RX ORDER — PROPOFOL 10 MG/ML
VIAL (ML) INTRAVENOUS CONTINUOUS PRN
Status: DISCONTINUED | OUTPATIENT
Start: 2019-07-05 | End: 2019-07-05 | Stop reason: SURG

## 2019-07-05 RX ORDER — SODIUM CHLORIDE 0.9 % (FLUSH) 0.9 %
3-10 SYRINGE (ML) INJECTION AS NEEDED
Status: DISCONTINUED | OUTPATIENT
Start: 2019-07-05 | End: 2019-07-05 | Stop reason: HOSPADM

## 2019-07-05 RX ADMIN — PROPOFOL 140 MCG/KG/MIN: 10 INJECTION, EMULSION INTRAVENOUS at 12:01

## 2019-07-05 RX ADMIN — LIDOCAINE HYDROCHLORIDE 40 MG: 20 INJECTION, SOLUTION INFILTRATION; PERINEURAL at 12:01

## 2019-07-05 RX ADMIN — SODIUM CHLORIDE, POTASSIUM CHLORIDE, SODIUM LACTATE AND CALCIUM CHLORIDE: 600; 310; 30; 20 INJECTION, SOLUTION INTRAVENOUS at 10:56

## 2019-07-05 NOTE — ANESTHESIA POSTPROCEDURE EVALUATION
"Patient: Paty Hernandez    Procedure Summary     Date:  07/05/19 Room / [facility]  [facility] /  [facility]    Anesthesia Start:  1152 Anesthesia Stop:  1214    Procedure:  ESOPHAGOGASTRODUODENOSCOPY with biopsies (N/A Esophagus) Diagnosis:       Gastroesophageal reflux disease, esophagitis presence not specified      Weight gain, abnormal      Chronic diarrhea      Gastritis      Esophageal diverticulum      (Gastroesophageal reflux disease, esophagitis presence not specified [K21.9])      (Weight gain, abnormal [R63.5])      (Chronic diarrhea [K52.9])    Surgeon:  Solo Corrales MD Provider:  Salvatore Mcguire MD    Anesthesia Type:  MAC ASA Status:  3          Anesthesia Type: MAC  Last vitals  BP   138/69 (07/05/19 1233)   Temp   36.7 °C (98.1 °F) (07/05/19 1108)   Pulse   66 (07/05/19 1233)   Resp   16 (07/05/19 1233)     SpO2   99 % (07/05/19 1233)     Post Anesthesia Care and Evaluation    Patient location during evaluation: bedside  Patient participation: complete - patient participated  Level of consciousness: awake and alert  Pain management: adequate  Airway patency: patent  Anesthetic complications: No anesthetic complications    Cardiovascular status: acceptable  Respiratory status: acceptable  Hydration status: acceptable    Comments: /69   Pulse 66   Temp 36.7 °C (98.1 °F) (Oral)   Resp 16   Ht 157.5 cm (62\")   Wt 87.1 kg (192 lb 1.6 oz)   LMP  (LMP Unknown)   SpO2 99%   BMI 35.14 kg/m²           "

## 2019-07-05 NOTE — BRIEF OP NOTE
ESOPHAGOGASTRODUODENOSCOPY  Progress Note    Paty Hernandez  7/5/2019    Pre-op Diagnosis:   Gastroesophageal reflux disease, esophagitis presence not specified [K21.9]  Weight gain, abnormal [R63.5]  Chronic diarrhea [K52.9]       Post-Op Diagnosis Codes:     * Gastroesophageal reflux disease, esophagitis presence not specified [K21.9]     * Weight gain, abnormal [R63.5]     * Chronic diarrhea [K52.9]     * Gastritis [K29.70]     * Esophageal diverticulum [Q39.6]    Procedure/CPT® Codes:      Procedure(s):  ESOPHAGOGASTRODUODENOSCOPY with biopsies    Surgeon(s):  Solo Corrales MD    Anesthesia: Monitor Anesthesia Care    Staff:   Endo Technician: Shantell Bennett  Endo Nurse: Martine Chicas RN    Estimated Blood Loss: minimal    Urine Voided: * No values recorded between 7/5/2019 11:50 AM and 7/5/2019 12:11 PM *    Specimens:                Specimens     ID Source Type Tests Collected By Collected At Frozen?      A Gastric, Antrum Tissue · TISSUE PATHOLOGY EXAM   Solo Corrales MD 7/5/19 1209      B Esophagus, Mid Tissue · TISSUE PATHOLOGY EXAM   Solo Corrales MD 7/5/19 1210                  Drains:      Findings: EGD with esophageal diverticulum in the mid esophagus.  Extra contractions noted biopsies taken to rule out eosinophilic esophagitis.  GE junction was normal with antral gastritis.  Biopsies of the antrum were obtained.  Duodenum was normal.    Complications: None      Solo Corrales MD     Date: 7/5/2019  Time: 12:12 PM

## 2019-07-05 NOTE — ANESTHESIA PREPROCEDURE EVALUATION
Anesthesia Evaluation     Patient summary reviewed and Nursing notes reviewed   no history of anesthetic complications:  NPO Solid Status: > 6 hours  NPO Liquid Status: > 6 hours           Airway   Mallampati: II  TM distance: >3 FB  Neck ROM: full  no difficulty expected and No difficulty expected  Dental - normal exam     Pulmonary - normal exam    breath sounds clear to auscultation  (+) asthma, sleep apnea,   (-) rhonchi, decreased breath sounds, wheezes, rales, stridor  Cardiovascular - normal exam    NYHA Classification: I  Rhythm: regular  Rate: normal    (+) hypertension, CAD, hyperlipidemia,   (-) murmur, weak pulses, friction rub, systolic click, carotid bruits, JVD, peripheral edema      Neuro/Psych  (+) headaches, psychiatric history Depression,     GI/Hepatic/Renal/Endo    (+) obesity, morbid obesity, GERD,  renal disease CRI, diabetes mellitus type 2 well controlled,     Musculoskeletal     (+) back pain, neck pain,   Abdominal  - normal exam    Abdomen: soft.   Substance History - negative use     OB/GYN negative ob/gyn ROS         Other   (+) arthritis                     Anesthesia Plan    ASA 3     MAC     intravenous induction   Anesthetic plan, all risks, benefits, and alternatives have been provided, discussed and informed consent has been obtained with: patient.

## 2019-07-08 ENCOUNTER — TELEPHONE (OUTPATIENT)
Dept: GASTROENTEROLOGY | Facility: CLINIC | Age: 69
End: 2019-07-08

## 2019-07-08 NOTE — TELEPHONE ENCOUNTER
----- Message from Yazmin Saez sent at 7/8/2019 10:23 AM EDT -----  Regarding: Question  Contact: [phone]  Pt wants to speak with a nurse regarding procedure w biopsy results also regarding medications (dexilant- dexlansoprazole and zantc-ranitidine) Please call back after 1:30pm # [phone]

## 2019-07-10 NOTE — TELEPHONE ENCOUNTER
Returned patient's phone call. She questions if she should continue her Ranitidine at night. Advised yes. She states the Dexilant is a little pricey for her budget but does not want to change medications yet. She wants to try it for a month before she decides she wants to continue it. Advised to call back if she wants the medication changed. She verb understanding.

## 2019-08-06 ENCOUNTER — TELEPHONE (OUTPATIENT)
Dept: GASTROENTEROLOGY | Facility: CLINIC | Age: 69
End: 2019-08-06

## 2019-08-06 NOTE — TELEPHONE ENCOUNTER
Called pt... No answer after multiple rings; left a message that per Dr cisse: the path results from her upper endoscopy (EGD) showed mild inflammation in her stomach and esophagus. Advised MD recommends to maintain on the Dexilant, but if she has not seen great improvement in symptoms or if med is too expensive, she can call back and we can change her to something else. Pt to call back with any questions or concerns to to request change in med.

## 2019-08-06 NOTE — TELEPHONE ENCOUNTER
----- Message from Solo Corrales MD sent at 8/2/2019  9:25 AM EDT -----  Biopsies with gastritis and reflux esophagitis.  Maintain on proton pump inhibitor and if not improved can change to brand.

## 2019-08-08 ENCOUNTER — APPOINTMENT (OUTPATIENT)
Dept: WOMENS IMAGING | Facility: HOSPITAL | Age: 69
End: 2019-08-08

## 2019-08-08 PROCEDURE — 77063 BREAST TOMOSYNTHESIS BI: CPT | Performed by: RADIOLOGY

## 2019-08-08 PROCEDURE — 77080 DXA BONE DENSITY AXIAL: CPT | Performed by: RADIOLOGY

## 2019-08-08 PROCEDURE — 77067 SCR MAMMO BI INCL CAD: CPT | Performed by: RADIOLOGY

## 2019-08-16 ENCOUNTER — TELEPHONE (OUTPATIENT)
Dept: GASTROENTEROLOGY | Facility: CLINIC | Age: 69
End: 2019-08-16

## 2019-08-16 NOTE — TELEPHONE ENCOUNTER
----- Message from Tatum Diaz sent at 8/16/2019  8:41 AM EDT -----  Regarding: loose stool   Contact: [phone]  Loose stool for 3-4 times a week

## 2019-08-16 NOTE — TELEPHONE ENCOUNTER
Returned patient's phone call. She states she has always had issues with loose stools. The frequency has increased to 3-4 times a week, multiple times a days and this is new. She states she has been taking her Colesevelam and has not seen any difference in the stool frequency. States she is taking Imodium but it has not helped much. She states she has her gallbladder out in January and parathyroid in March. On Monday patient is going to see her endocrinologist She questions what to do next. Offered patient a f/u visit with the NP and she is in agreement.

## 2019-08-20 ENCOUNTER — OFFICE VISIT (OUTPATIENT)
Dept: GASTROENTEROLOGY | Facility: CLINIC | Age: 69
End: 2019-08-20

## 2019-08-20 VITALS
BODY MASS INDEX: 35.22 KG/M2 | WEIGHT: 191.4 LBS | DIASTOLIC BLOOD PRESSURE: 84 MMHG | HEIGHT: 62 IN | TEMPERATURE: 97.7 F | SYSTOLIC BLOOD PRESSURE: 148 MMHG

## 2019-08-20 DIAGNOSIS — D64.9 ANEMIA, UNSPECIFIED TYPE: ICD-10-CM

## 2019-08-20 DIAGNOSIS — K21.9 GASTROESOPHAGEAL REFLUX DISEASE, ESOPHAGITIS PRESENCE NOT SPECIFIED: ICD-10-CM

## 2019-08-20 DIAGNOSIS — R19.7 DIARRHEA, UNSPECIFIED TYPE: Primary | ICD-10-CM

## 2019-08-20 LAB
FOLATE SERPL-MCNC: >20 NG/ML (ref 4.78–24.2)
IRON SATN MFR SERPL: 14 % (ref 20–50)
IRON SERPL-MCNC: 56 MCG/DL (ref 37–145)
TIBC SERPL-MCNC: 390 MCG/DL
UIBC SERPL-MCNC: 334 MCG/DL (ref 112–346)
VIT B12 SERPL-MCNC: 374 PG/ML (ref 211–946)

## 2019-08-20 PROCEDURE — 99213 OFFICE O/P EST LOW 20 MIN: CPT | Performed by: NURSE PRACTITIONER
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
## 2019-08-21 ENCOUNTER — TELEPHONE (OUTPATIENT)
Dept: GASTROENTEROLOGY | Facility: CLINIC | Age: 69
End: 2019-08-21

## 2019-08-21 NOTE — TELEPHONE ENCOUNTER
----- Message from SHAJI Allan sent at 8/20/2019  3:25 PM EDT -----  Contact: [phone]  Can we reach out to the patient.  Looks like she is wanting an okay to visit her  in the nursing home.  Without the results of her stool testing I cannot say if her diarrhea is infectious or not.  She could take precautions such as excellent hand hygiene while she visits with him.     ----- Message -----  From: Denisa Carvalho  Sent: 8/20/2019  12:02 PM  To: SHAJI Allan    You seen this patient this morning and she wants to know if she goes to see her  in the nursing home is there something she can catch from there or something she could give them. Please call her at [phone].

## 2019-08-21 NOTE — PROGRESS NOTES
Please notify patient that vitamin B12 and folate are normal.  Her iron levels are normal.  Her iron saturation slightly decreased.  Keep appointment with hematologist.

## 2019-08-26 RX ORDER — ISOSORBIDE MONONITRATE 30 MG/1
TABLET, EXTENDED RELEASE ORAL
Qty: 90 TABLET | Refills: 2 | OUTPATIENT
Start: 2019-08-26

## 2019-08-29 RX ORDER — ISOSORBIDE MONONITRATE 30 MG/1
TABLET, EXTENDED RELEASE ORAL
Qty: 90 TABLET | Refills: 0 | Status: SHIPPED | OUTPATIENT
Start: 2019-08-29 | End: 2019-11-19 | Stop reason: SDUPTHER

## 2019-09-03 RX ORDER — ALPRAZOLAM 1 MG/1
TABLET ORAL
Qty: 90 TABLET | Refills: 4 | OUTPATIENT
Start: 2019-09-03

## 2019-09-05 RX ORDER — ALPRAZOLAM 1 MG/1
TABLET ORAL
Qty: 90 TABLET | Refills: 4 | OUTPATIENT
Start: 2019-09-05

## 2019-09-20 RX ORDER — LOSARTAN POTASSIUM 100 MG/1
TABLET ORAL
Qty: 30 TABLET | Refills: 0 | Status: SHIPPED | OUTPATIENT
Start: 2019-09-20

## 2019-10-28 RX ORDER — MONTELUKAST SODIUM 10 MG/1
10 TABLET ORAL DAILY
Qty: 30 TABLET | Refills: 0 | Status: SHIPPED | OUTPATIENT
Start: 2019-10-28 | End: 2019-12-02 | Stop reason: SDUPTHER

## 2019-11-05 RX ORDER — VALACYCLOVIR HYDROCHLORIDE 1 G/1
1000 TABLET, FILM COATED ORAL DAILY
Qty: 90 TABLET | Refills: 3 | Status: SHIPPED | OUTPATIENT
Start: 2019-11-05

## 2019-11-12 ENCOUNTER — TELEPHONE (OUTPATIENT)
Dept: GASTROENTEROLOGY | Facility: CLINIC | Age: 69
End: 2019-11-12

## 2019-11-12 RX ORDER — LANSOPRAZOLE 30 MG/1
30 CAPSULE, DELAYED RELEASE ORAL DAILY
Qty: 90 CAPSULE | Refills: 1 | Status: SHIPPED | OUTPATIENT
Start: 2019-11-12 | End: 2020-05-21 | Stop reason: SDUPTHER

## 2019-11-12 NOTE — TELEPHONE ENCOUNTER
----- Message from Debby Torres sent at 11/12/2019  9:02 AM EST -----  Regarding: med refill  Contact: [phone]  Pt went to  her dexilant and the pharmacy is telling her it will be 300.00, can you please call the pt to discuss her options are other meds or on how to reduce the cost,thank you

## 2019-11-12 NOTE — TELEPHONE ENCOUNTER
Called [facility] pharmacy and spoke with the pharmacist. She states insurance is picking up on the medication, but pt is left with a $345.00 for 90 day supply. Advised will speak with the pt and see what she prefers to do. She verb understanding.    Esomeprazole, and omeprazole listed in pt's med list.   Called pt and advised that I received her message about needing an alternative for Dexilant. Asked if she has ever used lansoprazole (Prevacid) before in the past. She stated no. Advised will send this to her pharmacy. Pt verb understanding.

## 2019-11-19 RX ORDER — ISOSORBIDE MONONITRATE 30 MG/1
TABLET, EXTENDED RELEASE ORAL
Qty: 90 TABLET | Refills: 0 | Status: SHIPPED | OUTPATIENT
Start: 2019-11-19 | End: 2020-04-28

## 2019-11-20 RX ORDER — CARVEDILOL 6.25 MG/1
6.25 TABLET ORAL DAILY
Qty: 90 TABLET | Refills: 3 | Status: SHIPPED | OUTPATIENT
Start: 2019-11-20 | End: 2020-06-08 | Stop reason: SDUPTHER

## 2019-12-03 RX ORDER — MONTELUKAST SODIUM 10 MG/1
TABLET ORAL
Qty: 30 TABLET | Refills: 0 | Status: SHIPPED | OUTPATIENT
Start: 2019-12-03

## 2019-12-11 ENCOUNTER — TELEPHONE (OUTPATIENT)
Dept: SURGERY | Facility: CLINIC | Age: 69
End: 2019-12-11

## 2019-12-11 NOTE — TELEPHONE ENCOUNTER
Called home phone, rang then said this number cannot be completed as dialed? Called cell phone, greeting message was in [place], left message to return my call.

## 2019-12-12 NOTE — TELEPHONE ENCOUNTER
Pt says she is getting vit B injections, latest CBC all low, taking iron. Around the same time she started Having issues of fecal incontinence, stools are formed, (+) fecal urgency. Started 2-3 months ago. No fiber, no stool softeners. Told pt to start Fiber, explained directions. Told her to try for a couple of weeks and if no improvement call back for an appt.

## 2020-01-23 NOTE — TELEPHONE ENCOUNTER
ADDEND  If it is on the right side, then yes, she should proceed.  Otherwise, it is not likely her gallbladder and so we should not.  Mary Ellen Tamez MD  Pt now having abd/pelvic pain. Would like to know if she should go ahead and proceed with Lap Marissa  
It is on her right side. And so she would like to proceed  
No rash

## 2020-02-25 ENCOUNTER — TELEPHONE (OUTPATIENT)
Dept: GASTROENTEROLOGY | Facility: CLINIC | Age: 70
End: 2020-02-25

## 2020-02-25 RX ORDER — MONTELUKAST SODIUM 4 MG/1
TABLET, CHEWABLE ORAL
Qty: 60 TABLET | Refills: 11 | Status: SHIPPED | OUTPATIENT
Start: 2020-02-25

## 2020-02-25 NOTE — TELEPHONE ENCOUNTER
I changed her prescription from WelChol to Colestid.  Looks like she did not follow-up from her visit in August for diarrhea symptoms.  If she still having diarrhea she needs to complete previously recommended stool testing.  Thanks BG

## 2020-02-25 NOTE — TELEPHONE ENCOUNTER
Called pt and advised that Niecy has prescribed Colestid to take instead of Welchol. She is not currently having diarrhea. She will call back if Colestid is still too expensive.    Handoff

## 2020-02-25 NOTE — TELEPHONE ENCOUNTER
----- Message from Yazmin Saze sent at 2/25/2020 10:49 AM EST -----  Regarding: medicatication   Contact: [phone]  Pt is calling regarding colesevelam (WELCHOL) 625 MG tablet her insurance changed and is too expensive now, she is asking for a substitute.

## 2020-04-28 RX ORDER — ISOSORBIDE MONONITRATE 30 MG/1
TABLET, EXTENDED RELEASE ORAL
Qty: 90 TABLET | Refills: 0 | Status: SHIPPED | OUTPATIENT
Start: 2020-04-28

## 2020-05-06 RX ORDER — ROSUVASTATIN CALCIUM 10 MG/1
10 TABLET, COATED ORAL DAILY
Qty: 30 TABLET | Refills: 2 | Status: SHIPPED | OUTPATIENT
Start: 2020-05-06

## 2020-05-21 ENCOUNTER — TELEPHONE (OUTPATIENT)
Dept: GASTROENTEROLOGY | Facility: CLINIC | Age: 70
End: 2020-05-21

## 2020-05-21 RX ORDER — LANSOPRAZOLE 30 MG/1
30 CAPSULE, DELAYED RELEASE ORAL DAILY
Qty: 90 CAPSULE | Refills: 0 | Status: SHIPPED | OUTPATIENT
Start: 2020-05-21

## 2020-06-08 RX ORDER — CARVEDILOL 6.25 MG/1
6.25 TABLET ORAL DAILY
Qty: 90 TABLET | Refills: 3 | Status: SHIPPED | OUTPATIENT
Start: 2020-06-08

## 2020-11-16 RX ORDER — VALACYCLOVIR HYDROCHLORIDE 1 G/1
TABLET, FILM COATED ORAL
Qty: 96 TABLET | Refills: 0 | OUTPATIENT
Start: 2020-11-16

## 2020-11-17 RX ORDER — VALACYCLOVIR HYDROCHLORIDE 1 G/1
TABLET, FILM COATED ORAL
Qty: 96 TABLET | Refills: 0 | OUTPATIENT
Start: 2020-11-17

## 2020-12-10 RX ORDER — CARVEDILOL 6.25 MG/1
TABLET ORAL
Qty: 158 TABLET | Refills: 0 | OUTPATIENT
Start: 2020-12-10

## 2021-03-22 ENCOUNTER — IMMUNIZATION (OUTPATIENT)
Dept: VACCINE CLINIC | Facility: HOSPITAL | Age: 71
End: 2021-03-22

## 2021-03-22 ENCOUNTER — BULK ORDERING (OUTPATIENT)
Dept: CASE MANAGEMENT | Facility: OTHER | Age: 71
End: 2021-03-22

## 2021-03-22 DIAGNOSIS — Z23 IMMUNIZATION DUE: ICD-10-CM

## 2021-03-22 PROCEDURE — 91300 HC SARSCOV02 VAC 30MCG/0.3ML IM: CPT | Performed by: INTERNAL MEDICINE

## 2021-03-22 PROCEDURE — 0001A: CPT | Performed by: INTERNAL MEDICINE

## 2021-04-12 ENCOUNTER — IMMUNIZATION (OUTPATIENT)
Dept: VACCINE CLINIC | Facility: HOSPITAL | Age: 71
End: 2021-04-12

## 2021-04-12 PROCEDURE — 91300 HC SARSCOV02 VAC 30MCG/0.3ML IM: CPT | Performed by: INTERNAL MEDICINE

## 2021-04-12 PROCEDURE — 0002A: CPT | Performed by: INTERNAL MEDICINE

## (undated) DEVICE — CANN NASL CO2 TRULINK W/O2 A/

## (undated) DEVICE — TUBING, SUCTION, 1/4" X 10', STRAIGHT: Brand: MEDLINE

## (undated) DEVICE — SUT VIC 5/0 PS2 18IN J495H

## (undated) DEVICE — Device: Brand: DEFENDO AIR/WATER/SUCTION AND BIOPSY VALVE

## (undated) DEVICE — FRCP BX RADJAW4 NDL 2.8 240CM LG OG BX40

## (undated) DEVICE — GLV SURG BIOGEL LTX PF 6 1/2

## (undated) DEVICE — ENDOPATH PNEUMONEEDLE INSUFFLATION NEEDLES WITH LUER LOCK CONNECTORS 120MM: Brand: ENDOPATH

## (undated) DEVICE — SKIN AFFIX SURG ADHESIVE 72/CS 0.55ML: Brand: MEDLINE

## (undated) DEVICE — CANNULA,ADULT,SOFT-TOUCH,7'TUBE,UC: Brand: PENDING

## (undated) DEVICE — SYR LUERLOK 20CC

## (undated) DEVICE — PK LAP CHOLE BG

## (undated) DEVICE — ENDOPATH XCEL BLADELESS TROCARS WITH STABILITY SLEEVES: Brand: ENDOPATH XCEL

## (undated) DEVICE — SUT VIC 0 TN 27IN DYED JTN0G

## (undated) DEVICE — THE TORRENT IRRIGATION SCOPE CONNECTOR IS USED WITH THE TORRENT IRRIGATION TUBING TO PROVIDE IRRIGATION FLUIDS SUCH AS STERILE WATER DURING GASTROINTESTINAL ENDOSCOPIC PROCEDURES WHEN USED IN CONJUNCTION WITH AN IRRIGATION PUMP (OR ELECTROSURGICAL UNIT).: Brand: TORRENT

## (undated) DEVICE — BITEBLOCK OMNI BLOC

## (undated) DEVICE — SKIN PREP TRAY W/CHG: Brand: MEDLINE INDUSTRIES, INC.